# Patient Record
Sex: MALE | Race: OTHER | Employment: OTHER | URBAN - METROPOLITAN AREA
[De-identification: names, ages, dates, MRNs, and addresses within clinical notes are randomized per-mention and may not be internally consistent; named-entity substitution may affect disease eponyms.]

---

## 2019-06-01 ENCOUNTER — APPOINTMENT (OUTPATIENT)
Dept: GENERAL RADIOLOGY | Facility: HOSPITAL | Age: 54
DRG: 438 | End: 2019-06-01
Attending: EMERGENCY MEDICINE
Payer: COMMERCIAL

## 2019-06-01 ENCOUNTER — APPOINTMENT (OUTPATIENT)
Dept: CT IMAGING | Facility: HOSPITAL | Age: 54
DRG: 438 | End: 2019-06-01
Attending: EMERGENCY MEDICINE
Payer: COMMERCIAL

## 2019-06-01 ENCOUNTER — HOSPITAL ENCOUNTER (INPATIENT)
Facility: HOSPITAL | Age: 54
LOS: 6 days | Discharge: HOME HEALTH | DRG: 438 | End: 2019-06-07
Attending: EMERGENCY MEDICINE | Admitting: INTERNAL MEDICINE
Payer: COMMERCIAL

## 2019-06-01 DIAGNOSIS — R78.81 BACTEREMIA: Primary | ICD-10-CM

## 2019-06-01 PROBLEM — K85.91: Status: ACTIVE | Noted: 2019-06-01

## 2019-06-01 PROBLEM — D72.829 LEUKOCYTOSIS: Status: ACTIVE | Noted: 2019-06-01

## 2019-06-01 PROCEDURE — 99254 IP/OBS CNSLTJ NEW/EST MOD 60: CPT | Performed by: SURGERY

## 2019-06-01 PROCEDURE — 71045 X-RAY EXAM CHEST 1 VIEW: CPT | Performed by: EMERGENCY MEDICINE

## 2019-06-01 PROCEDURE — 74177 CT ABD & PELVIS W/CONTRAST: CPT | Performed by: EMERGENCY MEDICINE

## 2019-06-01 RX ORDER — ACETAMINOPHEN 500 MG
1000 TABLET ORAL ONCE
Status: COMPLETED | OUTPATIENT
Start: 2019-06-01 | End: 2019-06-01

## 2019-06-01 RX ORDER — HYDROCODONE BITARTRATE AND ACETAMINOPHEN 5; 325 MG/1; MG/1
1 TABLET ORAL EVERY 4 HOURS PRN
Status: ON HOLD | COMMUNITY
End: 2019-07-02

## 2019-06-01 RX ORDER — FAMOTIDINE 10 MG/ML
20 INJECTION, SOLUTION INTRAVENOUS ONCE
Status: COMPLETED | OUTPATIENT
Start: 2019-06-01 | End: 2019-06-01

## 2019-06-01 RX ORDER — HEPARIN SODIUM 5000 [USP'U]/ML
5000 INJECTION, SOLUTION INTRAVENOUS; SUBCUTANEOUS EVERY 12 HOURS SCHEDULED
Status: DISCONTINUED | OUTPATIENT
Start: 2019-06-01 | End: 2019-06-07

## 2019-06-01 RX ORDER — SODIUM CHLORIDE 9 MG/ML
INJECTION, SOLUTION INTRAVENOUS CONTINUOUS
Status: DISCONTINUED | OUTPATIENT
Start: 2019-06-01 | End: 2019-06-03

## 2019-06-01 RX ORDER — ONDANSETRON 2 MG/ML
4 INJECTION INTRAMUSCULAR; INTRAVENOUS EVERY 6 HOURS PRN
Status: DISCONTINUED | OUTPATIENT
Start: 2019-06-01 | End: 2019-06-07

## 2019-06-01 RX ORDER — MORPHINE SULFATE 2 MG/ML
2 INJECTION, SOLUTION INTRAMUSCULAR; INTRAVENOUS EVERY 4 HOURS PRN
Status: DISCONTINUED | OUTPATIENT
Start: 2019-06-01 | End: 2019-06-02

## 2019-06-01 NOTE — ED INITIAL ASSESSMENT (HPI)
Pt came in for fever over 102F since Monday. Recently treated with IV antibiotics for sepsis related to pancreatitis. RR even and nonlabored, mask applied, speaking in full sentences, ambulatory with steady gait.

## 2019-06-01 NOTE — ED PROVIDER NOTES
Patient Seen in: [facility] AND [facility] Emergency Department    History   Patient presents with:  Fever (infectious)    Stated Complaint:     HPI    Patient complains of fever chills body aches.   Patient reports that he was recently hospitalized with sepsis d awake tired appearing  HEAD: normocephalic, atraumatic,   EYES: PERRLA, EOMI, conj sclera clear  THROAT: mmm, no lesions  NECK: supple, no meningeal signs  LUNGS: no resp distress, cta bilateral  CARDIO: tachy regualr without murmur  GI: abdomen is soft an Normal   LIPASE - Normal   CBC WITH DIFFERENTIAL WITH PLATELET    Narrative: The following orders were created for panel order CBC WITH DIFFERENTIAL WITH PLATELET.   Procedure                               Abnormality         Status (CST): Zara Galicia MD on 6/01/2019 at 16:14            Procedures:      Critical Care:    CT pending      MDM   Consulted ID pancultured will get a CT abdomen to rule out a pancreatic pseudocyst possible abscess.   Sepsis bolus ordered along with broad-spe

## 2019-06-01 NOTE — ED NOTES
Orders for admission, patient is aware of plan and ready to go upstairs.  Any questions, please call ED RN Tammy Flores  at extension [phone]    Sepsis bolus total 2886 ml, currently infusing 886 ml to complete total bolus

## 2019-06-02 ENCOUNTER — APPOINTMENT (OUTPATIENT)
Dept: ULTRASOUND IMAGING | Facility: HOSPITAL | Age: 54
DRG: 438 | End: 2019-06-02
Attending: INTERNAL MEDICINE
Payer: COMMERCIAL

## 2019-06-02 PROCEDURE — 99232 SBSQ HOSP IP/OBS MODERATE 35: CPT | Performed by: SURGERY

## 2019-06-02 PROCEDURE — 76705 ECHO EXAM OF ABDOMEN: CPT | Performed by: INTERNAL MEDICINE

## 2019-06-02 RX ORDER — MORPHINE SULFATE 2 MG/ML
2 INJECTION, SOLUTION INTRAMUSCULAR; INTRAVENOUS
Status: DISCONTINUED | OUTPATIENT
Start: 2019-06-02 | End: 2019-06-07

## 2019-06-02 RX ORDER — ACETAMINOPHEN 325 MG/1
650 TABLET ORAL EVERY 6 HOURS PRN
Status: DISCONTINUED | OUTPATIENT
Start: 2019-06-02 | End: 2019-06-07

## 2019-06-02 RX ORDER — FLUCONAZOLE 2 MG/ML
400 INJECTION, SOLUTION INTRAVENOUS EVERY 24 HOURS
Status: DISCONTINUED | OUTPATIENT
Start: 2019-06-02 | End: 2019-06-07

## 2019-06-02 NOTE — CONSULTS
[address] [place] [address]  TEL: [phone]  FAX: [phone]    Luis Gotti Patient Status:  Inpatient    1965 MRN [id number]   Location [address] [place] [address] Attending Addison Ruano MD   Hosp Day # 1 PCP Kathrin Rubio Allergies    Medications:    Current Facility-Administered Medications:   •  morphINE sulfate (PF) 2 MG/ML injection 2 mg, 2 mg, Intravenous, Q3H PRN  •  acetaminophen (TYLENOL) tab 650 mg, 650 mg, Oral, Q6H PRN  •  Meropenem (MERREM) 500 mg in sodium chlo 06/02/2019    CREATSERUM 1.07 06/02/2019    BUN 13 06/02/2019     06/02/2019    K 3.8 06/02/2019     06/02/2019    CO2 23.0 06/02/2019     06/02/2019    CA 8.1 06/02/2019     06/02/2019       Microbiologic Data:     Reviewed in EM

## 2019-06-02 NOTE — CONSULTS
[facility] HOSP - [facility]    Report of Consultation    Bruno Rebollar Patient Status:  Inpatient    1965 MRN [id number]   Location [facility]5W Attending Mickey Collins MD   Hosp Day # 0 PCP Jatin Velasco     Date of Admission:  2019 Respiratory: Negative. Cardiovascular: Negative. Gastrointestinal: Positive for nausea and abdominal pain. Endocrine: Negative. Genitourinary: Negative. Musculoskeletal: Negative. Skin: Negative. Allergic/Immunologic: Negative.     Neuro Xr Chest Ap Portable  (cpt=71045)    Result Date: 6/1/2019  CONCLUSION:  1. Suboptimal inspiration. Mild bibasilar atelectasis. No large consolidation.     Dictated by (CST): Travis Tripathi MD on 6/01/2019 at 15:30     Approved by (CST): Travis Tripathi MD

## 2019-06-02 NOTE — PLAN OF CARE
Problem: Patient Centered Care  Goal: Patient preferences are identified and integrated in the patient's plan of care  Description  Interventions:  - What would you like us to know as we care for you?   - Provide timely, complete, and accurate informatio during anticipated neutropenic period  Description  INTERVENTIONS  - Monitor WBC  - Administer growth factors as ordered  - Implement neutropenic guidelines  Outcome: Progressing     Problem: SAFETY ADULT - FALL  Goal: Free from fall injury  Description  I

## 2019-06-02 NOTE — PROGRESS NOTES
[facility]    Progress Note    Josenial Overall Patient Status:  Inpatient    1965 MRN [id number]   Location [facility]5W Attending Beto Schilling MD   Hosp Day # 1 PCP [facility] - [facility]     Assessment and Plan:       Subacute p Urine -- 300 250    Void Urine Occurrence -- 1 x --    Total Output -- 300 250       Net I/O     -- 3800 -250          Exam:   /59 (BP Location: Left arm)   Pulse 96   Temp 100.2 °F (37.9 °C) (Oral)   Resp 20   Ht 6' (1.829 m)   Wt 212 lb (96.2 kg infiltration. 3.  Circumaortic left renal vein, normal anatomic variant. 4.  Colonic diverticulosis.      Dictated by (CST): Ana Lubin MD on 6/01/2019 at 16:07     Approved by (CST): Ana Lubin MD on 6/01/2019 at 16:14                    Ella Leyva MD

## 2019-06-02 NOTE — H&P
[address] Patient Status:  Inpatient    1965 MRN [id number]   Location [facility]5W Attending Rikki Gaines MD   Hosp Day # 0 MARITZA Castle     Date:  2019  Date of Admissi weakness  Endocrine: negative  Allergic/Immunologic: negative    Physical Exam:  /66 (BP Location: Left arm)   Pulse 88   Temp 98.6 °F (37 °C) (Oral)   Resp 18   Ht 6' (1.829 m)   Wt 212 lb (96.2 kg)   SpO2 99%   BMI 28.75 kg/m²     General Appearanc

## 2019-06-02 NOTE — PLAN OF CARE
Problem: Patient/Family Goals  Goal: Patient/Family Long Term Goal  Description  Patient's Long Term Goal: to return home    Interventions:  - monitor vitals  - monitor test results  - administer meds  - See additional Care Plan goals for specific interven based on assessment  - Modify environment to reduce risk of injury  - Provide assistive devices as appropriate  - Consider OT/PT consult to assist with strengthening/mobility  - Encourage toileting schedule  Outcome: Progressing     Problem: Caridad Velazquez

## 2019-06-02 NOTE — PROGRESS NOTES
[facility] Pharmacy Note:  Renal Adjustment for Meropenem (MERREM) 500 mg in sodium chloride 0.9% 100 mL    Luis Eduardo Mark is a 52year old male who has been prescribed Meropenem (MERREM) 500 mg in sodium chloride 0.9% 100 mL every 24 hrs.   CrCl is estimated creati

## 2019-06-02 NOTE — PROGRESS NOTES
[facility]    Progress Note    Michelle Alexanderallum Patient Status:  Inpatient    1965 MRN [id number]   Location [facility]5W Attending Faye Villalta MD   Hosp Day # 1 PCP JEFF ELLER     SUBJECTIVE:  Pt seen and examined at Intravenous Q8H       Assessment:  Assessment:  Patient Active Problem List:     Leukocytosis     Bacteremia     1. Pancreatic necrosis with possible abscess or cyst–recent history of pancreatitis, bacteremia  2. Leukocytosis  3.   History of bacteremia

## 2019-06-03 ENCOUNTER — APPOINTMENT (OUTPATIENT)
Dept: CT IMAGING | Facility: HOSPITAL | Age: 54
DRG: 438 | End: 2019-06-03
Attending: CLINICAL NURSE SPECIALIST
Payer: COMMERCIAL

## 2019-06-03 PROCEDURE — 99232 SBSQ HOSP IP/OBS MODERATE 35: CPT | Performed by: SURGERY

## 2019-06-03 PROCEDURE — 99152 MOD SED SAME PHYS/QHP 5/>YRS: CPT | Performed by: CLINICAL NURSE SPECIALIST

## 2019-06-03 PROCEDURE — 0F9G30Z DRAINAGE OF PANCREAS WITH DRAINAGE DEVICE, PERCUTANEOUS APPROACH: ICD-10-PCS | Performed by: RADIOLOGY

## 2019-06-03 PROCEDURE — 99153 MOD SED SAME PHYS/QHP EA: CPT | Performed by: CLINICAL NURSE SPECIALIST

## 2019-06-03 PROCEDURE — 49406 IMAGE CATH FLUID PERI/RETRO: CPT | Performed by: CLINICAL NURSE SPECIALIST

## 2019-06-03 RX ORDER — MORPHINE SULFATE 2 MG/ML
2 INJECTION, SOLUTION INTRAMUSCULAR; INTRAVENOUS EVERY 2 HOUR PRN
Status: DISCONTINUED | OUTPATIENT
Start: 2019-06-03 | End: 2019-06-07

## 2019-06-03 RX ORDER — SODIUM CHLORIDE 9 MG/ML
INJECTION, SOLUTION INTRAVENOUS CONTINUOUS
Status: DISCONTINUED | OUTPATIENT
Start: 2019-06-03 | End: 2019-06-03

## 2019-06-03 RX ORDER — NALOXONE HYDROCHLORIDE 0.4 MG/ML
80 INJECTION, SOLUTION INTRAMUSCULAR; INTRAVENOUS; SUBCUTANEOUS AS NEEDED
Status: DISCONTINUED | OUTPATIENT
Start: 2019-06-03 | End: 2019-06-07

## 2019-06-03 RX ORDER — MORPHINE SULFATE 4 MG/ML
4 INJECTION, SOLUTION INTRAMUSCULAR; INTRAVENOUS EVERY 2 HOUR PRN
Status: DISCONTINUED | OUTPATIENT
Start: 2019-06-03 | End: 2019-06-07

## 2019-06-03 RX ORDER — ACETAMINOPHEN 325 MG/1
650 TABLET ORAL EVERY 4 HOURS PRN
Status: DISCONTINUED | OUTPATIENT
Start: 2019-06-03 | End: 2019-06-07

## 2019-06-03 RX ORDER — 0.9 % SODIUM CHLORIDE 0.9 %
VIAL (ML) INJECTION
Status: COMPLETED
Start: 2019-06-03 | End: 2019-06-03

## 2019-06-03 RX ORDER — IPRATROPIUM BROMIDE AND ALBUTEROL SULFATE 2.5; .5 MG/3ML; MG/3ML
3 SOLUTION RESPIRATORY (INHALATION) EVERY 4 HOURS PRN
Status: DISCONTINUED | OUTPATIENT
Start: 2019-06-03 | End: 2019-06-07

## 2019-06-03 RX ORDER — MORPHINE SULFATE 4 MG/ML
INJECTION, SOLUTION INTRAMUSCULAR; INTRAVENOUS
Status: COMPLETED
Start: 2019-06-03 | End: 2019-06-03

## 2019-06-03 RX ORDER — FLUMAZENIL 0.1 MG/ML
0.2 INJECTION, SOLUTION INTRAVENOUS AS NEEDED
Status: DISCONTINUED | OUTPATIENT
Start: 2019-06-03 | End: 2019-06-07

## 2019-06-03 RX ORDER — MIDAZOLAM HYDROCHLORIDE 1 MG/ML
INJECTION INTRAMUSCULAR; INTRAVENOUS
Status: COMPLETED
Start: 2019-06-03 | End: 2019-06-03

## 2019-06-03 RX ORDER — HYDROCODONE BITARTRATE AND ACETAMINOPHEN 5; 325 MG/1; MG/1
1 TABLET ORAL EVERY 4 HOURS PRN
Status: DISCONTINUED | OUTPATIENT
Start: 2019-06-03 | End: 2019-06-07

## 2019-06-03 RX ORDER — IBUPROFEN 600 MG/1
600 TABLET ORAL EVERY 8 HOURS PRN
Status: DISCONTINUED | OUTPATIENT
Start: 2019-06-03 | End: 2019-06-07

## 2019-06-03 RX ORDER — MIDAZOLAM HYDROCHLORIDE 1 MG/ML
1 INJECTION INTRAMUSCULAR; INTRAVENOUS EVERY 5 MIN PRN
Status: DISCONTINUED | OUTPATIENT
Start: 2019-06-03 | End: 2019-06-07

## 2019-06-03 RX ORDER — HYDROCODONE BITARTRATE AND ACETAMINOPHEN 5; 325 MG/1; MG/1
2 TABLET ORAL EVERY 4 HOURS PRN
Status: DISCONTINUED | OUTPATIENT
Start: 2019-06-03 | End: 2019-06-07

## 2019-06-03 RX ORDER — MORPHINE SULFATE 2 MG/ML
1 INJECTION, SOLUTION INTRAMUSCULAR; INTRAVENOUS EVERY 2 HOUR PRN
Status: DISCONTINUED | OUTPATIENT
Start: 2019-06-03 | End: 2019-06-07

## 2019-06-03 NOTE — PROGRESS NOTES
[facility] ID PROGRESS NOTE    Mamadou Pelaezbeck Patient Status:  Inpatient    1965 MRN [id number]   Location [facility]5W Attending Jumana Montelongo MD   Hosp Day # 2 PCP DAYBREAK OF RINA ELLER     Subjective:  Awake, Tamx 102.5 overnight.  Remains NPO for I cell count was 25,000. CT scan of the abdomen pelvis shows peripancreatic inflammation along with fluid with some gas around the body and tail of the pancreas. So far, blood cultures negative. He continues to have fevers.   Continues to have the pain with

## 2019-06-03 NOTE — PROGRESS NOTES
In report RN was told patient is to have a drain placement with IR. IR does not have patient on the schedule, paged Dr. Carmen Schuler for orders or to contact IR deferred to Primary care. Dr. Adrianne Torres here rounding and was told face to face will call IR for consult.

## 2019-06-03 NOTE — PROGRESS NOTES
[facility]    Progress Note    Gil Michele Patient Status:  Inpatient    1965 MRN [id number]   Location [facility]5W Attending Rachel Moncada MD   Hosp Day # 2 PCP [facility] - [facility]     Assessment and Plan:       Subacute p mg in sodium chloride 0.9% 100 mL MBP) 100 -- --    Total Intake 4100 1100 400       Output    Urine  300  1200  --    Urine 300 1200 --    Void Urine Occurrence 1 x -- 1 x    Drains  --  --  80    Output (mL) (Closed/Suction Drain Lateral LLQ Bulb 12 Fr. )

## 2019-06-03 NOTE — BRIEF PROCEDURE NOTE
[facility] HOSP - [facility]  Procedure Note    Laura Delphine Patient Status:  Inpatient    1965 MRN [id number]   Location [facility]5W Attending Junior Mishra MD   Hosp Day # 2 PCP [facility] - [facility]     Procedure: CT-guided percutaneous

## 2019-06-03 NOTE — PAYOR COMM NOTE
--------------  ADMISSION REVIEW     Payor: [address] [place] [address] JORGE/ARSEN  Subscriber #:  [id number]  Authorization Number: [id number]    Admit date: 6/1/19  Admit time: 1843       Admitting Physician: Faye Villalta MD  Attending Physician:  Faye Villalta MD  [facility] (*)     Blood Urine Moderate (*)     Urobilinogen Urine 4.0 (*)     Ascorbic Acid Urine 40  (*)     RBC URINE 18 (*)     Bacteria Urine Few (*)     All other components within normal limits   BASIC METABOLIC PANEL (8) - Abnormal; Notable for the following possibility of infected pseudocyst or abscess. 2.  Hepatomegaly. Geographic pattern of fatty infiltration. 3.  Circumaortic left renal vein, normal anatomic variant. 4.  Colonic diverticulosis.           Disposition and Plan     Clinical Impression:  Marisol Mcfadden °C)Abnormal  — — — — — — — CL   06/02/19 1334 102.8 °F (39.3 °C)Abnormal  99 22 116/68 95 % — None (Room air) — CL       CA 8.1  WBC 16.1  HGB 10.4  HCT 31.3  RBC 3.49  NEUTROPHIL 13.40      ATTENDING -     Fever 102.5 last night  Still with abdomin unpredictable.        6/3/19 -     06/03/19 1215 99 °F (37.2 °C) 78 16 122/74 100 % — None (Room air) —    06/03/19 0903 99.2 °F (37.3 °C) 75 18 119/74 98 % — None (Room air) —    06/03/19 0447 100.4 °F (38 °C) 84 20 119/59 94 % — None (Room air) —

## 2019-06-03 NOTE — CM/SW NOTE
SW informed pt had recently been at [facility] with iv abx. However, the pt's abx had ended May 26, so he was not currently receiving these at home. SW will await indication by ID team of needs for abx and or drain care at [facility].     YOANNA Reagan,

## 2019-06-03 NOTE — PLAN OF CARE
Problem: Patient Centered Care  Goal: Patient preferences are identified and integrated in the patient's plan of care  Description  Interventions:  - What would you like us to know as we care for you?  I do not like taking morphine  - Provide timely, comp period  Description  INTERVENTIONS  - Monitor WBC  - Administer growth factors as ordered  - Implement neutropenic guidelines  Outcome: Progressing  Note:   Intermittent fevers noted     Problem: SAFETY ADULT - FALL  Goal: Free from fall injury  Descriptio medications at this time per Patient \"I think its the pressure of the fluid, I want to wait till after the procedure\"

## 2019-06-03 NOTE — DIETARY NOTE
Addendum 6/5/19: Met with patient to follow-up on PO intake and tolerance of Ensure Clear Apple TID. Pt advanced to low fiber/soft diet; reports eating well with improved appetite.  Pt reports eating >75% of last two meals: 6/4 dinner - meatloaf, mashed pot assistance: independent  - Nutrition education: assess education needs  - Coordination of nutrition care: collaboration with other providers  - Discharge and transfer of nutrition care to new setting or provider: monitor plans    ADMITTING DIAGNOSIS:   Varinder Bullock as above  ESTIMATED NUTRITION NEEDS:  Calories: 6073-9398 calories/day (20-22 calories per kg Actual body wt (ABW))  Protein: 115 grams protein/day (1.2 grams protein per kg Actual body wt (ABW))    MONITOR AND EVALUATE/NUTRITION GOALS:  - Food and Nutrien

## 2019-06-03 NOTE — PLAN OF CARE
Problem: Patient Centered Care  Goal: Patient preferences are identified and integrated in the patient's plan of care  Description  Interventions:  - What would you like us to know as we care for you?   - Provide timely, complete, and accurate informatio injury  Description  INTERVENTIONS:  - Assess pt frequently for physical needs  - Identify cognitive and physical deficits and behaviors that affect risk of falls.   - [place] fall precautions as indicated by assessment.  - Educate pt/family on patient sa

## 2019-06-03 NOTE — PROGRESS NOTES
[facility]    Progress Note    Raquel Billbrian Patient Status:  Inpatient    1965 MRN [id number]   Location [facility]5W Attending Mejia Modi MD   Hosp Day # 2 PCP JEFF ELLER     SUBJECTIVE:  Pt seen and examined at history of pancreatitis, bacteremia  2.  Leukocytosis–trending down  3.  History of bacteremia     Plan:     Continue n.p.o.  IV hydration  Pain control  Antiemetic  Surgery consult, spoke to Dr. Amanda Melissa, recommended IR consult. IR consult placed.   Spoke to CHARLES

## 2019-06-04 ENCOUNTER — APPOINTMENT (OUTPATIENT)
Dept: PICC SERVICES | Facility: HOSPITAL | Age: 54
DRG: 438 | End: 2019-06-04
Attending: PHYSICIAN ASSISTANT
Payer: COMMERCIAL

## 2019-06-04 PROCEDURE — 02HV33Z INSERTION OF INFUSION DEVICE INTO SUPERIOR VENA CAVA, PERCUTANEOUS APPROACH: ICD-10-PCS | Performed by: INTERNAL MEDICINE

## 2019-06-04 PROCEDURE — 99232 SBSQ HOSP IP/OBS MODERATE 35: CPT | Performed by: SURGERY

## 2019-06-04 RX ORDER — 0.9 % SODIUM CHLORIDE 0.9 %
3 VIAL (ML) INJECTION AS NEEDED
Status: DISCONTINUED | OUTPATIENT
Start: 2019-06-04 | End: 2019-06-07

## 2019-06-04 RX ORDER — SODIUM CHLORIDE 0.9 % (FLUSH) 0.9 %
10 SYRINGE (ML) INJECTION AS NEEDED
Status: DISCONTINUED | OUTPATIENT
Start: 2019-06-04 | End: 2019-06-07

## 2019-06-04 RX ORDER — CALCIUM CARBONATE 200(500)MG
1000 TABLET,CHEWABLE ORAL 2 TIMES DAILY PRN
Status: DISCONTINUED | OUTPATIENT
Start: 2019-06-04 | End: 2019-06-07

## 2019-06-04 RX ORDER — LIDOCAINE HYDROCHLORIDE 10 MG/ML
0.5 INJECTION, SOLUTION INFILTRATION; PERINEURAL ONCE AS NEEDED
Status: ACTIVE | OUTPATIENT
Start: 2019-06-04 | End: 2019-06-04

## 2019-06-04 RX ORDER — MAGNESIUM HYDROXIDE/ALUMINUM HYDROXICE/SIMETHICONE 120; 1200; 1200 MG/30ML; MG/30ML; MG/30ML
30 SUSPENSION ORAL 2 TIMES DAILY PRN
Status: DISCONTINUED | OUTPATIENT
Start: 2019-06-04 | End: 2019-06-07

## 2019-06-04 RX ORDER — 0.9 % SODIUM CHLORIDE 0.9 %
VIAL (ML) INJECTION
Status: COMPLETED
Start: 2019-06-04 | End: 2019-06-04

## 2019-06-04 NOTE — PLAN OF CARE
Focus: FEVER  Data: RECHECKED TEMP 1 HR AFTER TYLENOL GIVEN. 101.6  Action: APPLIED ICE PACK, DR. Bhavesh Copeland MADE AWARE, RECEIVED ORDER FOR AND GAVE IBUPROFEN.

## 2019-06-04 NOTE — PAYOR COMM NOTE
--------------  CONTINUED STAY REVIEW    Payor: [address] [place] [address] POS/ARSEN  Subscriber #:  [id number]  Authorization Number: [id number]    Admit date: 6/1/19  Admit time: 1843    Admitting Physician: Romeo Ceballos MD  Attending Physician:  Romeo Ceballos MD  [facility]

## 2019-06-04 NOTE — PROGRESS NOTES
[facility]    Progress Note    Micky Payan Patient Status:  Inpatient    1965 MRN [id number]   Location [facility]5W Attending Rose Greer MD   Hosp Day # 3 PCP [facility] RINA ELLER     Assessment and Plan:       Subacute p (Oral)   Resp 18   Ht 6' (1.829 m)   Wt 212 lb (96.2 kg)   SpO2 99%   BMI 28.75 kg/m²   Gen:  NAD  Abd:  Soft, mild left abdominal discomfort to palpation, ND, IR drain with thick purulent fluid.     Results:     Lab Results   Component Value Date    WBC 12

## 2019-06-04 NOTE — PROGRESS NOTES
[facility] Dosing Service    Initial Pharmacokinetic Consult for Vancomycin Dosing     Mamadou Reardon is a 52year old male who is being treated for intra-abdominal infection.   Pharmacy has been asked to dose Vancomycin by Dr. De Leon La Farge    He has No Known A

## 2019-06-04 NOTE — PROGRESS NOTES
[facility] ID PROGRESS NOTE    Walker Hastings Patient Status:  Inpatient    1965 MRN [id number]   Location [facility]5W Attending Kristy Cochran MD   Hosp Day # 3 PCP [facility] OF RINA ELLER     Subjective:  Awake, Tamx 101.6 overnight.  Having some pain yesterday with high fevers. White blood cell count was 25,000. CT scan of the abdomen pelvis shows peripancreatic inflammation along with fluid with some gas around the body and tail of the pancreas. So far, blood cultures negative.   He continues to have

## 2019-06-04 NOTE — CM/SW NOTE
SW notified that pt will likely need LT IV Abx at discharge. SW placed tentative referral to [facility] (pt lives in [place], [place] - close to [facility]). Sw to await for final recommendations.      Blaise Mendez, [address]

## 2019-06-04 NOTE — PROGRESS NOTES
[facility] [place] [facility]    [facility] Note     Patient Status:  Inpatient    1965 MRN [id number]   Location [facility]5W Attending Berto Brennan MD   Hosp Day # 3 PCP JEFF ELLER     SUBJECTIVE:  Pt seen and examined at acetaminophen (TYLENOL) tab 650 mg 650 mg Oral Q4H PRN   Or      HYDROcodone-acetaminophen (NORCO) 5-325 MG per tab 1 tablet 1 tablet Oral Q4H PRN   Or      HYDROcodone-acetaminophen (NORCO) 5-325 MG per tab 2 tablet 2 tablet Oral Q4H PRN   ipratropium-a

## 2019-06-05 PROCEDURE — 99232 SBSQ HOSP IP/OBS MODERATE 35: CPT | Performed by: SURGERY

## 2019-06-05 RX ORDER — POTASSIUM CHLORIDE 20 MEQ/1
40 TABLET, EXTENDED RELEASE ORAL EVERY 4 HOURS
Status: COMPLETED | OUTPATIENT
Start: 2019-06-05 | End: 2019-06-05

## 2019-06-05 NOTE — PLAN OF CARE
Problem: Patient Centered Care  Goal: Patient preferences are identified and integrated in the patient's plan of care  Description  Interventions:  - What would you like us to know as we care for you?  I do not like taking morphine  - Provide timely, comp Absence of fever/infection during anticipated neutropenic period  Description  INTERVENTIONS  - Monitor WBC  - Administer growth factors as ordered  - Implement neutropenic guidelines  Outcome: Progressing     Problem: SAFETY ADULT - FALL  Goal: Free from

## 2019-06-05 NOTE — PROGRESS NOTES
[facility] ID PROGRESS NOTE    Pilar Kirkland Patient Status:  Inpatient    1965 MRN [id number]   Location [facility]5W Attending Nhung Pettit MD   Hosp Day # 4 PCP [facility] OF RINA ELLER     Subjective:  Awake, Tamx 100 overnight. Feeling better.  Georgie Eid deep inspiration. He presented here yesterday with high fevers. White blood cell count was 25,000. CT scan of the abdomen pelvis shows peripancreatic inflammation along with fluid with some gas around the body and tail of the pancreas.  So far, blood cult

## 2019-06-05 NOTE — PAYOR COMM NOTE
--------------  CONTINUED STAY REVIEW    Payor: Uyen PATEL/ARSEN  Subscriber #:  [id number]  Authorization Number: [id number]    Admit date: 6/1/19  Admit time: 1843    Admitting Physician: Carolyn Amaro MD  Attending Physician:  Carolyn Amaro MD  [facility]

## 2019-06-05 NOTE — PROGRESS NOTES
[facility]    Progress Note     Patient Status:  Inpatient    1965 MRN [id number]   Location [facility]5W Attending Berto Brennan MD   Hosp Day # 4 PCP JEFF ELLER     SUBJECTIVE:  Pt seen and examined at chewable tab 1,000 mg 1,000 mg Oral BID PRN   Midazolam HCl (VERSED) 2 MG/2ML injection 1 mg 1 mg Intravenous Q5 Min PRN   fentaNYL citrate (SUBLIMAZE) 0.05 MG/ML injection 50 mcg 50 mcg Intravenous Q5 Min PRN   Naloxone HCl (NARCAN) 0.4 MG/ML injection 80 prophylaxis     POC discussed with nursing. Possible discharge soon with home IV antibiotic.     Plan of care discussed in detail with patient at bedside.  All questions answered.        [place] Guillermo MD   6/5/2019  4:30 PM

## 2019-06-05 NOTE — PROGRESS NOTES
[facility]    Progress Note    Dannial Overall Patient Status:  Inpatient    1965 MRN [id number]   Location [facility]5W Attending Beto Schilling MD   Hosp Day # 4 PCP DAYBREAK OF RINA ELLER     Assessment and Plan:       Subacute p Urine Occurrence 1 x -- --    Drains  230  145  25    Output (mL) (Closed/Suction Drain Lateral LLQ Bulb 12 Fr.) 230 145 25    Total Output 480 445 25       Net I/O     480 1175 -25          Exam:   /68 (BP Location: Right arm)   Pulse 78   Temp 98. 6

## 2019-06-06 ENCOUNTER — APPOINTMENT (OUTPATIENT)
Dept: CT IMAGING | Facility: HOSPITAL | Age: 54
DRG: 438 | End: 2019-06-06
Attending: PHYSICIAN ASSISTANT
Payer: COMMERCIAL

## 2019-06-06 PROCEDURE — 74177 CT ABD & PELVIS W/CONTRAST: CPT | Performed by: PHYSICIAN ASSISTANT

## 2019-06-06 PROCEDURE — 99232 SBSQ HOSP IP/OBS MODERATE 35: CPT | Performed by: SURGERY

## 2019-06-06 NOTE — PROGRESS NOTES
[facility]    Progress Note    Nate Zapata Patient Status:  Inpatient    1965 MRN [id number]   Location [facility]5W Attending Amy Archibald MD   Hosp Day # 5 PCP JEFF ELLER     SUBJECTIVE:  Pt seen and examined at Intravenous PRN   morphINE sulfate (PF) 2 MG/ML injection 1 mg 1 mg Intravenous Q2H PRN   Or      morphINE sulfate (PF) 2 MG/ML injection 2 mg 2 mg Intravenous Q2H PRN   Or      morphINE sulfate (PF) 4 MG/ML injection 4 mg 4 mg Intravenous Q2H PRN   acetam

## 2019-06-06 NOTE — PROGRESS NOTES
[facility]    Progress Note    Nandini Lara Patient Status:  Inpatient    1965 MRN [id number]   Location [facility]5W Attending Khalif Hogue MD   Hosp Day # 5 PCP [facility] - [facility]     Assessment and Plan:        Subacute (Ampicillin-Sulbactam Sodium (UNASYN) 3 g in sodium chloride 0.9% 100 mL MBP/ADD-vantage) -- 200 --    Total Intake 1620 220 480       Output    Urine  300  --  --    Urine 300 -- --    Drains  145  105  --    Output (mL) (Closed/Suction Drain Lateral LLQ

## 2019-06-06 NOTE — PROGRESS NOTES
[facility] ID PROGRESS NOTE    Angus Stanley Patient Status:  Inpatient    1965 MRN [id number]   Location [facility]5W Attending Jeannine Martini MD   Hosp Day # 5 PCP [facility]BREAK OF RINA ELLER     Subjective:  Awake, Tmax 101.2 overnight with chills.  80 cc Increased pain in the left side with some pickups. Increased pain with deep inspiration. He presented here yesterday with high fevers. White blood cell count was 25,000.   CT scan of the abdomen pelvis shows peripancreatic inflammation along with fluid wi

## 2019-06-06 NOTE — CM/SW NOTE
SW informed of likely need for iv abx at [place]. SW spoke with the pt who is comfortable, able and willing to do the home teaching as he had done that a few weeks ago.  Pt does not remember the name of the previous agency, and is agreeable to use services that

## 2019-06-06 NOTE — PAYOR COMM NOTE
--------------  CONTINUED STAY REVIEW    Payor: [address] [place] [address] JORGE/ARSEN  Subscriber #:  [id number]  Authorization Number: [id number]    Admit date: 6/1/19  Admit time: 1843    Admitting Physician: Tucker Fairbanks MD  Attending Physician:  Tucker Fairbanks MD  [facility] (NARCAN) 0.4 MG/ML injection 80 mcg 80 mcg Intravenous PRN   Flumazenil (ROMAZICON) injection 0.2 mg 0.2 mg Intravenous PRN   morphINE sulfate (PF) 2 MG/ML injection 1 mg 1 mg Intravenous Q2H PRN   Or         morphINE sulfate (PF) 2 MG/ML injection 2 mg 2 questions answered.     Alma Navarro MD   6/6/2019

## 2019-06-06 NOTE — PLAN OF CARE
Problem: Patient Centered Care  Goal: Patient preferences are identified and integrated in the patient's plan of care  Description  Interventions:  - What would you like us to know as we care for you?  I do not like taking morphine  - Provide timely, comp with strengthening/mobility  - Encourage toileting schedule  Outcome: Progressing     Problem: GASTROINTESTINAL - ADULT  Goal: Minimal or absence of nausea and vomiting  Description  INTERVENTIONS:  - Maintain adequate hydration with IV or PO as ordered an

## 2019-06-07 VITALS
DIASTOLIC BLOOD PRESSURE: 73 MMHG | SYSTOLIC BLOOD PRESSURE: 107 MMHG | HEART RATE: 79 BPM | OXYGEN SATURATION: 96 % | WEIGHT: 212 LBS | RESPIRATION RATE: 18 BRPM | BODY MASS INDEX: 28.71 KG/M2 | HEIGHT: 72 IN | TEMPERATURE: 99 F

## 2019-06-07 PROCEDURE — 99231 SBSQ HOSP IP/OBS SF/LOW 25: CPT | Performed by: SURGERY

## 2019-06-07 RX ORDER — VANCOMYCIN HYDROCHLORIDE 125 MG/1
125 CAPSULE ORAL DAILY
Qty: 28 CAPSULE | Refills: 0 | Status: ON HOLD | OUTPATIENT
Start: 2019-06-07 | End: 2019-06-18

## 2019-06-07 NOTE — PLAN OF CARE
Problem: Patient Centered Care  Goal: Patient preferences are identified and integrated in the patient's plan of care  Description  Interventions:  - What would you like us to know as we care for you?  I do not like taking morphine  - Provide timely, comp distraction and/or relaxation techniques  - Monitor for opioid side effects  - Notify MD/LIP if interventions unsuccessful or patient reports new pain  - Anticipate increased pain with activity and pre-medicate as appropriate  6/7/2019 1528 by Heaven David Progressing  6/7/2019 1356 by Cinthya Gracia RN  Outcome: Progressing   No fever this shift, will be discharged on invanz and daptomycin. Boaz drain intact.

## 2019-06-07 NOTE — PLAN OF CARE
Problem: Patient/Family Goals  Goal: Patient/Family Short Term Goal  Description  Patient's Short Term Goal: manage pain and nausea    Interventions:   - pain meds as needed  - IV fluids  - IV antibiotic  - See additional Care Plan goals for specific int ordered antiemetic medications  - Provide nonpharmacologic comfort measures as appropriate  - Advance diet as tolerated, if ordered  - Obtain nutritional consult as needed  - Evaluate fluid balance  Outcome: Progressing     Problem: RISK FOR INFECTION - AD

## 2019-06-07 NOTE — PROGRESS NOTES
[facility] ID PROGRESS NOTE    Teodoro Mensah Patient Status:  Inpatient    1965 MRN [id number]   Location [facility]5W Attending Jessica Taylor MD   Hosp Day # 6 PCP JEFF ELLER     Subjective:  Awake, Tmax 99.8. Afebrile. Tmax 99.8.  Still w Increased pain in the left side with some pickups. Increased pain with deep inspiration. He presented here yesterday with high fevers. White blood cell count was 25,000.   CT scan of the abdomen pelvis shows peripancreatic inflammation along with fluid w

## 2019-06-07 NOTE — PROGRESS NOTES
[facility] [facility]    Progress Note    Raquelcarroll Khanbrian Patient Status:  Inpatient    1965 MRN [id number]   Location [facility]5W Attending Mejia Modi MD   Hosp Day # 6 PCP DAYBREAK OF RINA ELLER     Assessment and Plan:     Stable clini 1.51 (H) 06/03/2019     06/02/2019    CK 23 (L) 06/05/2019       Ct Abdomen+pelvis(contrast Only)(cpt=74177)    Result Date: 6/6/2019  CONCLUSION:  1.  There has been placement of a left posterolateral approach percutaneous drainage catheter with con

## 2019-06-07 NOTE — CM/SW NOTE
4:12pm Update- Pt has been accepted by [facility]. Updated packet sent via [employer]. [facility] [phone]          4:01pm Update-Case declined by [facility], [facility].  Pending to [facility]  [phone], [place] at [place] [address]

## 2019-06-07 NOTE — PAYOR COMM NOTE
--------------  CONTINUED STAY REVIEW    Payor: Trula Fleischer POS/ARSEN  Subscriber #:  [id number]  Authorization Number: [id number]    Admit date: 6/1/19  Admit time: 1843    Admitting Physician: Tucker Fairbanks MD  Attending Physician:  Tucker Fairbanks MD  [facility] chills had improved. [facility] has been off the IV antibiotics for the past 4 to 5 days. [facility] states his last day of antibiotics was May 26.  He then started developing some progressive fatigue with fevers and chills.  Increased pain in the left side with some pi Dose Route User    6/7/2019 0609 New Bag 3 g Intravenous Judy Taylor RN    6/6/2019 2147 New Bag 3 g Intravenous Judy Taylor RN    6/6/2019 1444 New Bag 3 g Intravenous Timmy Sousa RN      Fluconazole in Sodium Chloride (DIFLUCAN) IVPB premix 4

## 2019-06-07 NOTE — DISCHARGE SUMMARY
[facility]    Discharge Summary    Madelyn Vargas Patient Status:  Inpatient    1965 MRN [id number]   Location [facility]5W Attending Cielo Moser MD   Hosp Day # 6 PCP Carlito Gomez     Date of Admission: 2019 Dis symptomatically for pain, nausea, vomiting. Seen and followed by surgery surgery service. Recommended for drain placement by IR. Patient was started on broad-spectrum antibiotic, infectious disease consulted.   Patient had drain placement by IR.fluid fro Follow up Visits:  Follow-up with PCP, surgery, ID in 2 weeks      Violeta Stanley  6/7/2019  1:52 PM

## 2019-06-14 ENCOUNTER — HOSPITAL ENCOUNTER (INPATIENT)
Facility: HOSPITAL | Age: 54
LOS: 4 days | Discharge: HOME HEALTH | DRG: 853 | End: 2019-06-18
Attending: EMERGENCY MEDICINE | Admitting: INTERNAL MEDICINE
Payer: COMMERCIAL

## 2019-06-14 ENCOUNTER — APPOINTMENT (OUTPATIENT)
Dept: GENERAL RADIOLOGY | Facility: HOSPITAL | Age: 54
DRG: 853 | End: 2019-06-14
Attending: EMERGENCY MEDICINE
Payer: COMMERCIAL

## 2019-06-14 ENCOUNTER — APPOINTMENT (OUTPATIENT)
Dept: CT IMAGING | Facility: HOSPITAL | Age: 54
DRG: 853 | End: 2019-06-14
Attending: EMERGENCY MEDICINE
Payer: COMMERCIAL

## 2019-06-14 DIAGNOSIS — A41.9 SEPSIS DUE TO UNDETERMINED ORGANISM (HCC): ICD-10-CM

## 2019-06-14 DIAGNOSIS — R50.9 ACUTE FEBRILE ILLNESS: Primary | ICD-10-CM

## 2019-06-14 DIAGNOSIS — J18.9 HCAP (HEALTHCARE-ASSOCIATED PNEUMONIA): ICD-10-CM

## 2019-06-14 DIAGNOSIS — K86.89 PERIPANCREATIC FLUID COLLECTION: ICD-10-CM

## 2019-06-14 PROCEDURE — 71045 X-RAY EXAM CHEST 1 VIEW: CPT | Performed by: EMERGENCY MEDICINE

## 2019-06-14 PROCEDURE — 74177 CT ABD & PELVIS W/CONTRAST: CPT | Performed by: EMERGENCY MEDICINE

## 2019-06-14 RX ORDER — IBUPROFEN 400 MG/1
400 TABLET ORAL ONCE
Status: COMPLETED | OUTPATIENT
Start: 2019-06-14 | End: 2019-06-14

## 2019-06-14 RX ORDER — HEPARIN SODIUM 5000 [USP'U]/ML
5000 INJECTION, SOLUTION INTRAVENOUS; SUBCUTANEOUS EVERY 8 HOURS SCHEDULED
Status: DISCONTINUED | OUTPATIENT
Start: 2019-06-14 | End: 2019-06-18

## 2019-06-14 RX ORDER — ACETAMINOPHEN 325 MG/1
650 TABLET ORAL EVERY 6 HOURS PRN
Status: DISCONTINUED | OUTPATIENT
Start: 2019-06-14 | End: 2019-06-18

## 2019-06-14 RX ORDER — SODIUM CHLORIDE 9 MG/ML
INJECTION, SOLUTION INTRAVENOUS CONTINUOUS
Status: DISCONTINUED | OUTPATIENT
Start: 2019-06-14 | End: 2019-06-17

## 2019-06-14 RX ORDER — MORPHINE SULFATE 2 MG/ML
2 INJECTION, SOLUTION INTRAMUSCULAR; INTRAVENOUS EVERY 4 HOURS PRN
Status: DISCONTINUED | OUTPATIENT
Start: 2019-06-14 | End: 2019-06-16

## 2019-06-14 RX ORDER — HYDROCODONE BITARTRATE AND ACETAMINOPHEN 5; 325 MG/1; MG/1
1 TABLET ORAL EVERY 4 HOURS PRN
Status: DISCONTINUED | OUTPATIENT
Start: 2019-06-14 | End: 2019-06-18

## 2019-06-14 RX ORDER — SODIUM CHLORIDE 9 MG/ML
INJECTION, SOLUTION INTRAVENOUS CONTINUOUS
Status: ACTIVE | OUTPATIENT
Start: 2019-06-14 | End: 2019-06-14

## 2019-06-14 NOTE — ED NOTES
Drainage noted around MYRA drain site. Dr. Carlos May aware. Instructed to reinforce with fenestrated gauze.

## 2019-06-14 NOTE — ED NOTES
Dao Hemphill is here for eval of persistent fevers. Recently admitted for bacteremia and pancreatic necrosis. Has MYRA drain placed in pancreas. He notes decreased drainage to drain. States fevers do not go away with antipyretics.   Last dose of tylenol at 2pm.

## 2019-06-14 NOTE — ED NOTES
Blood drawn from PICC line. 1 set of blood cultures obtained from PICC line. Another will be drawn peripherally by EDT.

## 2019-06-14 NOTE — ED INITIAL ASSESSMENT (HPI)
Fever for two days. Tylenol 30 minutes ago   Patient has picc line for antibiotics for pancreatitis.

## 2019-06-14 NOTE — ED PROVIDER NOTES
Patient Seen in: [facility] Emergency Department    History   Patient presents with:  Fever (infectious)    Stated Complaint: Fever 102.1, tylenol 30 minutes pta, recent discharge with pancreatitis, PICC l*     HPI    51 yo M with PMH pancreatitis w As per HPI  HENT: Negative for ear pain and rhinorrhea. (+) sore throat. Respiratory: (+) cough. Cardiovascular: Negative for chest pain and palpitations. Gastrointestinal: (+) abd pain.     Positive for stated complaint: Fever 102.1, tylenol 30 minute Albumin 2.7 (*)     Globulin  6.0 (*)     A/G Ratio 0.5 (*)     All other components within normal limits   BODY FLUID CULT,AEROBIC AND ANAEROBIC - Abnormal; Notable for the following components:    Body Fld Smear 2+ WBCs seen (*)     Body Fld Smear 3+ [facility]         Specimen Collected: 06/03/19 12:33 Last Resulted: 06/06/19 11:17             [facility]      Sepsis Reassessment Note    /84   Pulse 96   Temp (!) 101.1 °F (38.4 °C)   Resp 20   Wt 89.8 kg   SpO2 96%   BMI 26.85 k COMPARISON: [facility], CT DRAIN ABSCESS PERITONEAL (GUJ=94554), 6/03/2019, 11:08. [address] (UHK=01114), 6/02/2019, 9:32.   [facility], CT ABDOMEN PELVIS IV CONTRAST NO ORAL (ER), 6/01/2 particularly at the caudal retroperitoneal margin. SPLEEN: No enlargement. ADRENALS:   No defined mass or abnormal enlargement. KIDNEYS:   Symmetric enhancement is seen without evidence of hydronephrosis or underlying solid masses.  A partially exophytic edema is present. OTHER: Bilateral subpectoral implants are partially visualized. No free air or fluid is seen in the abdomen or pelvis. CONCLUSION:  1.  There has been placement of a left posterolateral approach percutaneous drainage catheter wit Approved by (CST): Dixon Rivers MD on 6/04/2019 at 17:41          Xr Chest Ap Portable  (cpt=71045)    Result Date: 6/14/2019  PROCEDURE: XR CHEST AP PORTABLE (CPT=71010) TIME: 1901  COMPARISON: [facility], XR CHEST AP PORTABLE (CPT=7104 Contrast, No Oral (er)    Result Date: 6/14/2019  PROCEDURE: CT ABDOMEN PELVIS IV CONTRAST NO ORAL (ER)  COMPARISON: [facility], CT ABDOMEN + PELVIS (CONTRAST ONLY) (CPT=74177), 6/06/2019, 12:28. INDICATIONS: Fever up to [address].   Epigastric in the left upper quadrant and central mesenteric distribution displaces surrounding loops of small bowel. No gas distended loops of small bowel are present to suggest obstruction. A noninflamed appendix is present in the right lower quadrant.   VASCULAR: dilatation. BILIARY: The gallbladder is present. No intra- or extrahepatic biliary ductal dilatation. SPLEEN: No enlargement or focal lesion. STOMACH: Small hiatal hernia. No gastric obstruction. Scattered duodenal diverticula.  PANCREAS: There is promi of gas associated with this finding raises possibility of infected pseudocyst or abscess. 2.  Hepatomegaly. Geographic pattern of fatty infiltration. 3.  Circumaortic left renal vein, normal anatomic variant. 4.  Colonic diverticulosis.      Dictated by (C This involved direct patient intervention, complex decision making, and/or extensive discussions with the patient, family, and clinical staff.     Disposition and Plan     Clinical Impression:  Acute febrile illness  (primary encounter diagnosis)  HCAP (he

## 2019-06-15 ENCOUNTER — APPOINTMENT (OUTPATIENT)
Dept: INTERVENTIONAL RADIOLOGY/VASCULAR | Facility: HOSPITAL | Age: 54
DRG: 853 | End: 2019-06-15
Attending: RADIOLOGY
Payer: COMMERCIAL

## 2019-06-15 PROCEDURE — 0FHG3YZ INSERTION OF OTHER DEVICE INTO PANCREAS, PERCUTANEOUS APPROACH: ICD-10-PCS | Performed by: RADIOLOGY

## 2019-06-15 PROCEDURE — 0FPG30Z REMOVAL OF DRAINAGE DEVICE FROM PANCREAS, PERCUTANEOUS APPROACH: ICD-10-PCS | Performed by: RADIOLOGY

## 2019-06-15 RX ORDER — LIDOCAINE HYDROCHLORIDE 20 MG/ML
INJECTION, SOLUTION EPIDURAL; INFILTRATION; INTRACAUDAL; PERINEURAL
Status: COMPLETED
Start: 2019-06-15 | End: 2019-06-15

## 2019-06-15 RX ORDER — MIDAZOLAM HYDROCHLORIDE 1 MG/ML
INJECTION INTRAMUSCULAR; INTRAVENOUS
Status: DISCONTINUED
Start: 2019-06-15 | End: 2019-06-15 | Stop reason: WASHOUT

## 2019-06-15 RX ORDER — IBUPROFEN 600 MG/1
600 TABLET ORAL EVERY 8 HOURS PRN
Status: DISCONTINUED | OUTPATIENT
Start: 2019-06-15 | End: 2019-06-18

## 2019-06-15 RX ORDER — SODIUM CHLORIDE 9 MG/ML
INJECTION, SOLUTION INTRAVENOUS
Status: COMPLETED
Start: 2019-06-15 | End: 2019-06-15

## 2019-06-15 RX ORDER — POTASSIUM CHLORIDE 14.9 MG/ML
20 INJECTION INTRAVENOUS ONCE
Status: COMPLETED | OUTPATIENT
Start: 2019-06-15 | End: 2019-06-15

## 2019-06-15 NOTE — H&P
[address] Patient Status:  Inpatient    1965 MRN [id number]   Location [facility] 5SW/SE Attending Armando Ford MD   Hosp Day # 0 MARITZA Rosado     Date:  2019  Date of A needed for Pain.  Disp:  Rfl:  6/12/2019       Review of Systems:  Constitutional: Fever and fatigue  Eyes: negative  Ears, nose, mouth, throat, and face: negative  Respiratory: negative for cough, dyspnea on exertion, hemoptysis, sputum and wheezing  Cardi Subacute pancreatic necrosis     Acute febrile illness     HCAP (healthcare-associated pneumonia)     Sepsis due to undetermined organism ([facility] [address]     Peripancreatic fluid collection      1. Acute febrile illness  2. Pancreatic abscess/necrosis  3.   HCAP  4

## 2019-06-15 NOTE — PROGRESS NOTES
[facility]    Progress Note    Mamadou Reardon Patient Status:  Inpatient    1965 MRN [id number]   Location [facility] 5SW/SE Attending Jumana Montelongo MD   Hosp Day # 1 PCP JEFF ELLER     SUBJECTIVE:  Pt seen and examine 0.9%  NaCl infusion  Intravenous Continuous       Assessment:     Leukocytosis     Bacteremia     Subacute pancreatic necrosis     Acute febrile illness     HCAP (healthcare-associated pneumonia)     Sepsis due to undetermined organism ([facility] [address]     Peripanc

## 2019-06-15 NOTE — PRE-SEDATION ASSESSMENT
[facility]  IR Pre-Procedure Sedation Assessment    History of snoring or sleep or apnea?    No    History of previous problems with anesthesia or sedation  No    Physical Findings:  Neck: nl ROM  CV: RRR  PULM: normal respiratory rate/effor

## 2019-06-15 NOTE — PROGRESS NOTES
Procedure performed in invasive cardiology/interventional Radiology. Drain exchanged and dressing changed. Specimen sent lab for culture. Hand off to Rn given.

## 2019-06-15 NOTE — PLAN OF CARE
Problem: Patient/Family Goals  Goal: Patient/Family Long Term Goal  Description  Patient's Long Term Goal: Treat infection    Interventions:  - Administer prescribed antibiotics  -assess and monitor for signs and symptoms of infection  -Infectious diseas Implement non-pharmacological measures as appropriate and evaluate response  - Consider cultural and social influences on pain and pain management  - Manage/alleviate anxiety  - Utilize distraction and/or relaxation techniques  - Monitor for opioid side ef

## 2019-06-15 NOTE — CONSULTS
[facility] ID CONSULT NOTE    Angus Stanley Patient Status:  Inpatient    1965 MRN [id number]   Location [facility] 5SW/SE Attending Jeannine Martini MD   Hosp Day # 1 PCP Lalo Johnson 70.       Reason for Consu history. reports that he has never smoked.  He has never used smokeless tobacco.    Allergies:  No Known Allergies    Medications:    Current Facility-Administered Medications:   •  ertapenem (INVANZ) 1 g in sodium chloride 0.9% 100 mL MBP, 1 g, Intraveno rhythm/pulse  Respiratory: Clear to auscultation bilaterally. No wheezes. No rhonchi. Abdomen: Soft, nontender, nondistended. BS active. L flank drain w/pos ttp, faint erythema, no drainage  Musculoskeletal: No edema noted  Integument: No lesions.  No kai dose of meropenem. Continued on daptomycin, ertapenem. ID consulted.      # Fever and leukocytosis, r/o bacteremia w/PICC vs intraabdom    - Peritoneal fluid cx- 3+ GNR/GPR   - Blood cx pending  # Severe pancreatitis with irineo-pancreatic inflammation with p

## 2019-06-15 NOTE — DIETARY NOTE
ADULT NUTRITION INITIAL ASSESSMENT    Pt is at moderate nutrition risk. Pt does not meet malnutrition criteria.       RECOMMENDATIONS TO MD:  See Nutrition Intervention     NUTRITION DIAGNOSIS/PROBLEM:  Inadequate oral intake related to alteration in GI fu pain    FOOD/NUTRITION RELATED HISTORY:  Appetite: Fair to good  Intake: NPO, advanced to clear today.     Intake Meeting Needs: No, but supplements to maximize  Food Allergies: No  Cultural/Ethnic/Gnosticism Preferences: Not Obtained    MEDICATIONS: reviewe

## 2019-06-15 NOTE — PROCEDURES
[place] [facility]  Brief IR Post-Procedure Note    Luis Shen Patient Status:  Inpatient    1965 MRN [id number]   Location [facility] 5SW/SE Attending Addison Ruano MD   Hosp Day # 1 PCP Kathrin Rubio     Procedure(s): Left

## 2019-06-16 PROCEDURE — 99254 IP/OBS CNSLTJ NEW/EST MOD 60: CPT | Performed by: SURGERY

## 2019-06-16 RX ORDER — POTASSIUM CHLORIDE 20 MEQ/1
40 TABLET, EXTENDED RELEASE ORAL ONCE
Status: COMPLETED | OUTPATIENT
Start: 2019-06-16 | End: 2019-06-16

## 2019-06-16 RX ORDER — 0.9 % SODIUM CHLORIDE 0.9 %
10 VIAL (ML) INJECTION AS NEEDED
Status: DISCONTINUED | OUTPATIENT
Start: 2019-06-16 | End: 2019-06-18

## 2019-06-16 RX ORDER — 0.9 % SODIUM CHLORIDE 0.9 %
20 VIAL (ML) INJECTION AS NEEDED
Status: DISCONTINUED | OUTPATIENT
Start: 2019-06-16 | End: 2019-06-18

## 2019-06-16 RX ORDER — MORPHINE SULFATE 2 MG/ML
2 INJECTION, SOLUTION INTRAMUSCULAR; INTRAVENOUS EVERY 4 HOURS PRN
Status: DISCONTINUED | OUTPATIENT
Start: 2019-06-16 | End: 2019-06-18

## 2019-06-16 NOTE — PLAN OF CARE
Problem: CARDIOVASCULAR - ADULT  Goal: Maintains optimal cardiac output and hemodynamic stability  Description  INTERVENTIONS:  - Monitor vital signs, rhythm, and trends  - Monitor for bleeding, hypotension and signs of decreased cardiac output  - Evalua Implement non-pharmacological measures as appropriate and evaluate response  - Consider cultural and social influences on pain and pain management  - Manage/alleviate anxiety  - Utilize distraction and/or relaxation techniques  - Monitor for opioid side ef

## 2019-06-16 NOTE — PLAN OF CARE
Problem: Patient/Family Goals  Goal: Patient/Family Long Term Goal  Description  Patient's Long Term Goal: Treat infection    Interventions:  - Administer prescribed antibiotics  -assess and monitor for signs and symptoms of infection  -Infectious diseas Nasogastric tube to low intermittent suction as ordered  - Evaluate effectiveness of ordered antiemetic medications  - Provide nonpharmacologic comfort measures as appropriate  - Advance diet as tolerated, if ordered  - Obtain nutritional consult as needed wound care per orders  - Initiate isolation precautions as appropriate  - Initiate Pressure Ulcer prevention bundle as indicated  Outcome: Progressing  MYRA draining purulent drainage, MYRA flushed w/ 10 ml NS at around [address], pt tolerated well.   Goal: S INFECTION - ADULT  Goal: Absence of fever/infection during anticipated neutropenic period  Description  INTERVENTIONS  - Monitor WBC  - Administer growth factors as ordered  - Implement neutropenic guidelines  Outcome: Not Progressing  Febrile, T 101.7, mo

## 2019-06-16 NOTE — PROGRESS NOTES
[facility] ID PROGRESS NOTE    Gayatri Bhatt Patient Status:  Inpatient    1965 MRN [id number]   Location [facility] 5SW/SE Attending Walker Fuentes MD   Hosp Day # 2 PCP DAYBREAK OF RINA ELLER     Subjective:   Febrile 101.7 early this AM. Awake and a fluconazole, transitioned to IV dapto and invanz x4 weeks with outpatient CT with PICC placed. Now presents to [facility] ER 6/14 with recurrent fevers. Abdominal pain improved since last admit, no emesis, diarrhea, dysuria.  Endorses sore throat and cough with in

## 2019-06-16 NOTE — CONSULTS
[facility]    Report of Consultation    Juan Acuña Patient Status:  Inpatient    1965 MRN [id number]   Location [facility] 5SW/SE Attending Melissa Escalante MD   Hosp Day # 2 PCP Naveen Ortiz     Date of Admission:   into the vein once for 1 dose. ertapenem 1 g 1 g in sodium chloride 0.9% 100 mL Inject 1 g into the vein one time for 1 dose. Vancomycin HCl 125 MG Oral Cap Take 1 capsule (125 mg total) by mouth daily for 28 days.    HYDROcodone-acetaminophen 5-325 MG HGB 10.5 (L) 06/15/2019    HCT 31.3 (L) 06/15/2019    .0 (H) 06/15/2019    CREATSERUM 1.04 06/15/2019    BUN 10 06/15/2019     06/15/2019    K 3.7 06/16/2019     06/15/2019    CO2 23.0 06/15/2019     (H) 06/15/2019    CA 8.0 (L) 0 the lower perioperative complications will be.     Recommendations:  Advance diet as tolerated. Ok to d/c home tomorrow if pt continues to do well. F/u in clinic in 2 weeks for surgical planning.         Thank you for allowing me to participate in the car

## 2019-06-17 PROCEDURE — 99232 SBSQ HOSP IP/OBS MODERATE 35: CPT | Performed by: SURGERY

## 2019-06-17 RX ORDER — POTASSIUM CHLORIDE 20 MEQ/1
40 TABLET, EXTENDED RELEASE ORAL ONCE
Status: COMPLETED | OUTPATIENT
Start: 2019-06-17 | End: 2019-06-17

## 2019-06-17 NOTE — PROGRESS NOTES
[facility] ID PROGRESS NOTE    Talon Guzman Patient Status:  Inpatient    1965 MRN [id number]   Location [facility] 5SW/SE Attending Tucker Fairbanks MD   Hosp Day # 3 PCP DAYBREAK OF RINA ELLER     Subjective:  Awake, Tmax 102.3 last night.  130 cc from melaninogenica. Repeat CT with decreased size of pseudocyst. Was on unasyn and fluconazole, transitioned to IV dapto and invanz x4 weeks with outpatient CT with PICC placed. Now presents to [facility] ER 6/14 with recurrent fevers.  Abdominal pain improved since l

## 2019-06-17 NOTE — PLAN OF CARE
Problem: Patient/Family Goals  Goal: Patient/Family Long Term Goal  Description  Patient's Long Term Goal: Treat infection    Interventions:  - Administer prescribed antibiotics  -assess and monitor for signs and symptoms of infection  -Infectious diseas suction as ordered  - Evaluate effectiveness of ordered antiemetic medications  - Provide nonpharmacologic comfort measures as appropriate  - Advance diet as tolerated, if ordered  - Obtain nutritional consult as needed  - Evaluate fluid balance  Outcome: appropriate  - Initiate Pressure Ulcer prevention bundle as indicated  Outcome: Progressing  Goal: Skin integrity remains intact  Description  INTERVENTIONS  - Assess and document risk factors for pressure ulcer development  - Assess and document skin inte

## 2019-06-17 NOTE — CM/SW NOTE
1242pm:  Carmen Dey from [facility] informed SW that pt is already current w/ their home services for Invanz 1g daily x4 weeks and Dapto 400 mg daily x4 weeks. Pt was arranged w/ [facility] for home teach & train.      Awaiting final orders to resume current IVAB o

## 2019-06-17 NOTE — PROGRESS NOTES
[facility]    Progress Note    Geena Morejon Patient Status:  Inpatient    1965 MRN [id number]   Location [facility] 5SW/SE Attending Armando Ford MD   Hosp Day # 3 PCP JEFF ELLER     SUBJECTIVE:  Pt seen and examine Peripancreatic fluid collection        1. Acute febrile illness  2. Pancreatic abscess/necrosis. Severe pancreatitis. Clogged percutaneous drain. 3.  HCAP  4. Sepsis  5. Leukocytosis  6.   History of bacteremia ESBL     Plan:  IR drain replacement wit

## 2019-06-17 NOTE — PAYOR COMM NOTE
--------------  ADMISSION REVIEW     Payor: Laura FRIEDMAN  Subscriber #:  [id number]  Authorization Number: [id number]    Admit date: 6/14/19  Admit time: 2104       Admitting Physician: Ebony Becker MD  Attending Physician:  Ebony Becker MD  [facility] following components:       Result Value    Protein Urine 100  (*)     Blood Urine Moderate (*)     RBC URINE 3 (*)     All other components within normal limits   COMP METABOLIC PANEL (14) - Abnormal; Notable for the following components:    Glucose 105 ( are identified in the abdomen or pelvis. Left lower lobe pneumonia with small effusion. Imaging follow-up to resolution recommended.         MDM     DIFFERENTIAL DIAGNOSIS: After history and physical exam differential diagnosis includes but is not limited 6/14/2019  Date of Admission:  6/14/2019      Assessment:  1. Acute febrile illness  2. Pancreatic abscess/necrosis  3. HCAP  4. Sepsis  5. Leukocytosis    Plan:  IR has been consulted by ER attending.   Plan for change of catheter by IR  Blood culture obstructive symptoms. IR drain in place with purulent fluid. Impression:     Subacute pancreatic necrosis    Leukocytosis    Bacteremia  -Readmitted for clogged percutaneous drain. S/p IR drain replacement with copious amount of purulent drainage.

## 2019-06-17 NOTE — PROGRESS NOTES
[place] [facility] - [facility]    Progress Note    Annamaria Burner Patient Status:  Inpatient    1965 MRN [id number]   Location [facility] 5SW/SE Attending Kody Leavitt MD   Hosp Day # 3 PCP [facility] - [facility]     Assessment and Plan:       Inocente Ontiveros Right;Posterior Back 14 Fr.) 48 146 165    Stool  --  --  --    Stool Occurrence -- 2 x --    Total Output 5971 [id number] 165       Net I/O     [phone]          Exam:   BP 96/62 (BP Location: Right arm)   Pulse 91   Temp 99.2 °F (37.3 °C) (Oral)   Resp 18

## 2019-06-17 NOTE — PLAN OF CARE
Problem: Patient/Family Goals  Goal: Patient/Family Long Term Goal  Description  Patient's Long Term Goal: Treat infection    Interventions:  - Administer prescribed antibiotics  -assess and monitor for signs and symptoms of infection  -Infectious diseas suction as ordered  - Evaluate effectiveness of ordered antiemetic medications  - Provide nonpharmacologic comfort measures as appropriate  - Advance diet as tolerated, if ordered  - Obtain nutritional consult as needed  - Evaluate fluid balance  Outcome: knowledge, values, beliefs, and cultural backgrounds into the planning and delivery of care  - Encourage patient/family to participate in care and decision-making at the level they choose  - Honor patient and family perspectives and choices   Outcome: Prog

## 2019-06-17 NOTE — PLAN OF CARE
Problem: Patient/Family Goals  Goal: Patient/Family Long Term Goal  Description  Patient's Long Term Goal: Treat infection    Interventions:  - Administer prescribed antibiotics  -assess and monitor for signs and symptoms of infection  -Infectious diseas suction as ordered  - Evaluate effectiveness of ordered antiemetic medications  - Provide nonpharmacologic comfort measures as appropriate  - Advance diet as tolerated, if ordered  - Obtain nutritional consult as needed  - Evaluate fluid balance  Outcome: appropriate  - Initiate Pressure Ulcer prevention bundle as indicated  Outcome: Progressing  Goal: Skin integrity remains intact  Description  INTERVENTIONS  - Assess and document risk factors for pressure ulcer development  - Assess and document skin inte draws blood. MYRA flushed, output recorded. IVAB administered. Plans to d/c to home on IV AB when stable.

## 2019-06-18 VITALS
WEIGHT: 206.31 LBS | HEART RATE: 84 BPM | BODY MASS INDEX: 27.94 KG/M2 | RESPIRATION RATE: 18 BRPM | TEMPERATURE: 98 F | HEIGHT: 72 IN | OXYGEN SATURATION: 97 % | DIASTOLIC BLOOD PRESSURE: 73 MMHG | SYSTOLIC BLOOD PRESSURE: 107 MMHG

## 2019-06-18 RX ORDER — POTASSIUM CHLORIDE 20 MEQ/1
40 TABLET, EXTENDED RELEASE ORAL ONCE
Status: COMPLETED | OUTPATIENT
Start: 2019-06-18 | End: 2019-06-18

## 2019-06-18 RX ORDER — VANCOMYCIN HYDROCHLORIDE 125 MG/1
125 CAPSULE ORAL DAILY
Qty: 28 CAPSULE | Refills: 0 | Status: ON HOLD | OUTPATIENT
Start: 2019-06-18 | End: 2019-07-02

## 2019-06-18 NOTE — PLAN OF CARE
Problem: Patient/Family Goals  Goal: Patient/Family Long Term Goal  Description  Patient's Long Term Goal: Treat infection    Interventions:  - Administer prescribed antibiotics  -assess and monitor for signs and symptoms of infection  -Infectious diseas control medications as ordered  - Initiate emergency measures for life threatening arrhythmias  - Monitor electrolytes and administer replacement therapy as ordered  6/18/2019 1736 by Codi Everett RN  Outcome: Adequate for Discharge  6/18/2019 non-pharmacological measures as appropriate and evaluate response  - Consider cultural and social influences on pain and pain management  - Manage/alleviate anxiety  - Utilize distraction and/or relaxation techniques  - Monitor for opioid side effects  - N information to patient/family  - Incorporate patient and family knowledge, values, beliefs, and cultural backgrounds into the planning and delivery of care  - Encourage patient/family to participate in care and decision-making at the level they choose  - H

## 2019-06-18 NOTE — PAYOR COMM NOTE
--------------  CONTINUED STAY REVIEW    Payor: [address] [place] [address] JORGE/ARSEN  Subscriber #:  [id number]  Authorization Number: [id number]    Admit date: 6/14/19  Admit time: 2104    Admitting Physician: Walker Fuentes MD  Attending Physician:  MD Yamel Horne fevers and chills. CT at that that time peripancreatic inflammation with fluid/gas. C/f possible necrosis. S/p IR drainage on 6/3 growing E.faecalis (S amp), S.anginosus, Prevotella melaninogenica.  Repeat CT with decreased size of pseudocyst. Was on unasyn pain. Overall feels better. Await Dapto susceptibility. Likely d/c in next 24h. Plan as above. Discussed with patient, RN, primary.             Cosigned by: Neda Murphy MD at 6/17/2019 10:52 PM             MEDICATIONS ADMINISTERED IN LAST 1 DAY:  DA

## 2019-06-18 NOTE — PAYOR COMM NOTE
--------------  CONTINUED STAY REVIEW    Payor: Helena PATEL/ARSEN  Subscriber #:  [id number]  Authorization Number: [id number]    Admit date: 6/14/19  Admit time: 2104    Admitting Physician: Hari Godwin MD  Attending Physician:  MD Jenniffer Dorado inflammation with fluid/gas. C/f possible necrosis. S/p IR drainage on 6/3 growing E.faecalis (S amp), S.anginosus, Prevotella melaninogenica.  Repeat CT with decreased size of pseudocyst. Was on unasyn and fluconazole, transitioned to IV dapto and invanz x Heparin Sodium (Porcine) 5000 UNIT/ML injection 5,000 Units     Date Action Dose Route User    6/18/2019 1321 Given 5000 Units Subcutaneous (Left Lower Abdomen) Angie Angeles RN      Meropenem (MERREM) 500 mg in sodium chloride 0.9% 100 mL MBP

## 2019-06-18 NOTE — PROGRESS NOTES
[facility] ID PROGRESS NOTE    Luis Eduardo Mark Patient Status:  Inpatient    1965 MRN [id number]   Location [facility] 5SW/SE Attending Ebony Becker MD   Hosp Day # 4 PCP DAYBREAK OF RINA ELLER     Subjective:  Awake, afebrile. No new complaints.      O Was on unasyn and fluconazole, transitioned to IV dapto and invanz x4 weeks with outpatient CT with PICC placed. Now presents to [address] [place] [address] ER 6/14 with recurrent fevers. Abdominal pain improved since last admit, no emesis, diarrhea, dysuria.  Endorses sore throat

## 2019-06-18 NOTE — CM/SW NOTE
Per Melinda Campos at [facility] to send patient home today    Patient to continue to receive same IVAB as previous admission    [facility] notified [facility] of patients discharge  Also CTL notified [facility] or discharge this evening    CTL will follow    Kari Boyer

## 2019-06-18 NOTE — PLAN OF CARE
Patient presents with vss, on IV abx, picc patent to left upper arm. MYRA drain flushed and patent. Afebrile. Call light and belongs within reach.

## 2019-06-18 NOTE — PLAN OF CARE
Per Dr. Vidhya Chester, pt states that when he was at home, he was flushing drain at the bulb site since he was unable to see where it would disconnect because it was towards his back. MD requests for RN to call [facility], [facility]. and inform her.  Spoke with [facility], [facility]. who state

## 2019-06-18 NOTE — DISCHARGE SUMMARY
[facility]    Discharge Summary    Mandy Mandelh Patient Status:  Inpatient    1965 MRN [id number]   Location [facility] 5SW/SE Attending Abby Miller MD   [address] Day # 4 PCP Siena Kenney     Date of Admission: 20 readmitted for clogged percutaneous drain. IR was consulted, had IR drain replacement with copious amount of purulent drainage. Patient was continued on daptomycin and meropenem. Patient was seen by surgery service and ID service.   Percutaneous drain co STOP taking these medications    ertapenem 1 g 1 g in sodium chloride 0.9% 100 mL  Replaced by:  ertapenem 1 g 1 g in sodium chloride 0.9% 100 mL              Where to Get Your Medications      Please  your prescriptions at the location directed b

## 2019-06-19 ENCOUNTER — TELEPHONE (OUTPATIENT)
Dept: MEDSURG UNIT | Facility: HOSPITAL | Age: 54
End: 2019-06-19

## 2019-06-19 NOTE — PAYOR COMM NOTE
--------------  DISCHARGE REVIEW    Payor: Dulce PATEL/ARSEN  Subscriber #:  [id number]  Authorization Number: [id number]    Admit date: 6/14/19  Admit time:  2104  Discharge Date: 6/18/2019  5:41 PM     Admitting Physician: Jumana Montelongo MD  Primary Car

## 2019-06-24 ENCOUNTER — OFFICE VISIT (OUTPATIENT)
Dept: CARDIOLOGY CLINIC | Facility: HOSPITAL | Age: 54
End: 2019-06-24
Attending: NURSE PRACTITIONER
Payer: COMMERCIAL

## 2019-06-24 VITALS
DIASTOLIC BLOOD PRESSURE: 74 MMHG | HEART RATE: 91 BPM | SYSTOLIC BLOOD PRESSURE: 105 MMHG | WEIGHT: 199 LBS | BODY MASS INDEX: 27 KG/M2 | OXYGEN SATURATION: 99 %

## 2019-06-24 DIAGNOSIS — J18.9 HCAP (HEALTHCARE-ASSOCIATED PNEUMONIA): Primary | ICD-10-CM

## 2019-06-24 DIAGNOSIS — J18.9 PNA (PNEUMONIA): ICD-10-CM

## 2019-06-24 DIAGNOSIS — K86.89 PERIPANCREATIC FLUID COLLECTION: ICD-10-CM

## 2019-06-24 PROCEDURE — 85025 COMPLETE CBC W/AUTO DIFF WBC: CPT | Performed by: NURSE PRACTITIONER

## 2019-06-24 PROCEDURE — 94618 PULMONARY STRESS TESTING: CPT | Performed by: NURSE PRACTITIONER

## 2019-06-24 PROCEDURE — 99213 OFFICE O/P EST LOW 20 MIN: CPT | Performed by: NURSE PRACTITIONER

## 2019-06-24 PROCEDURE — 80048 BASIC METABOLIC PNL TOTAL CA: CPT | Performed by: NURSE PRACTITIONER

## 2019-06-24 PROCEDURE — 36415 COLL VENOUS BLD VENIPUNCTURE: CPT | Performed by: NURSE PRACTITIONER

## 2019-06-24 NOTE — PATIENT INSTRUCTIONS
Use your incentive spirometer 4 sets of 10 daily     Continue all your same medications     Call if having any dizziness, lightheadedness, heart racing, palpitations, chest pain, shortness of breath, coughing, swelling, weight gain, fever, chills or worsen

## 2019-06-24 NOTE — PROGRESS NOTES
[facility] Patient Status:  No patient class for patient encounter    1965 MRN [id number]   Location [address]obert Horn is a 52year old male who presents t 12:36 PM     06/24/2019 12:36 PM    CO2 24.0 06/24/2019 12:36 PM    GLU 85 06/24/2019 12:36 PM    CA 9.2 06/24/2019 12:36 PM    ALB 2.7 (L) 06/14/2019 05:03 PM    ALKPHO 126 (H) 06/14/2019 05:03 PM    BILT 0.9 06/14/2019 05:03 PM    TP 8.7 (H) 06/14/ Dyspnea    Vaccines:  Flu: Next Due 9/1/19  PNA: Due    Education:  Patient and family instructed at length regarding clinic procedures, hours, purpose of clinic visits, medication regimen s/s of pneumonia and when to call APN/clinic. Patient receptive.

## 2019-06-28 ENCOUNTER — HOSPITAL ENCOUNTER (INPATIENT)
Dept: CT IMAGING | Facility: HOSPITAL | Age: 54
LOS: 4 days | Discharge: HOME HEALTH | DRG: 439 | End: 2019-07-02
Attending: INTERNAL MEDICINE | Admitting: INTERNAL MEDICINE
Payer: COMMERCIAL

## 2019-06-28 DIAGNOSIS — I82.90 THROMBOSIS: Primary | ICD-10-CM

## 2019-06-28 DIAGNOSIS — K85.91 NECROTIZING PANCREATITIS: ICD-10-CM

## 2019-06-28 LAB
ALBUMIN SERPL-MCNC: 3 G/DL (ref 3.4–5)
ALBUMIN/GLOB SERPL: 0.6 {RATIO} (ref 1–2)
ALP LIVER SERPL-CCNC: 89 U/L (ref 45–117)
ALT SERPL-CCNC: 21 U/L (ref 16–61)
ANION GAP SERPL CALC-SCNC: 9 MMOL/L (ref 0–18)
AST SERPL-CCNC: 11 U/L (ref 15–37)
BASOPHILS # BLD AUTO: 0.06 X10(3) UL (ref 0–0.2)
BASOPHILS NFR BLD AUTO: 0.3 %
BILIRUB SERPL-MCNC: 1.1 MG/DL (ref 0.1–2)
BUN BLD-MCNC: 10 MG/DL (ref 7–18)
BUN/CREAT SERPL: 8.1 (ref 10–20)
CALCIUM BLD-MCNC: 9.2 MG/DL (ref 8.5–10.1)
CHLORIDE SERPL-SCNC: 104 MMOL/L (ref 98–112)
CO2 SERPL-SCNC: 24 MMOL/L (ref 21–32)
CREAT BLD-MCNC: 1.23 MG/DL (ref 0.7–1.3)
DEPRECATED RDW RBC AUTO: 47.3 FL (ref 35.1–46.3)
EOSINOPHIL # BLD AUTO: 0.11 X10(3) UL (ref 0–0.7)
EOSINOPHIL NFR BLD AUTO: 0.6 %
ERYTHROCYTE [DISTWIDTH] IN BLOOD BY AUTOMATED COUNT: 14.6 % (ref 11–15)
GLOBULIN PLAS-MCNC: 5.2 G/DL (ref 2.8–4.4)
GLUCOSE BLD-MCNC: 109 MG/DL (ref 70–99)
HCT VFR BLD AUTO: 35.2 % (ref 39–53)
HGB BLD-MCNC: 11.4 G/DL (ref 13–17.5)
IMM GRANULOCYTES # BLD AUTO: 0.11 X10(3) UL (ref 0–1)
IMM GRANULOCYTES NFR BLD: 0.6 %
LYMPHOCYTES # BLD AUTO: 3.85 X10(3) UL (ref 1–4)
LYMPHOCYTES NFR BLD AUTO: 21.3 %
M PROTEIN MFR SERPL ELPH: 8.2 G/DL (ref 6.4–8.2)
MCH RBC QN AUTO: 28.6 PG (ref 26–34)
MCHC RBC AUTO-ENTMCNC: 32.4 G/DL (ref 31–37)
MCV RBC AUTO: 88.4 FL (ref 80–100)
MONOCYTES # BLD AUTO: 1.29 X10(3) UL (ref 0.1–1)
MONOCYTES NFR BLD AUTO: 7.1 %
NEUTROPHILS # BLD AUTO: 12.63 X10 (3) UL (ref 1.5–7.7)
NEUTROPHILS # BLD AUTO: 12.63 X10(3) UL (ref 1.5–7.7)
NEUTROPHILS NFR BLD AUTO: 70.1 %
OSMOLALITY SERPL CALC.SUM OF ELEC: 284 MOSM/KG (ref 275–295)
PLATELET # BLD AUTO: 432 10(3)UL (ref 150–450)
POTASSIUM SERPL-SCNC: 4 MMOL/L (ref 3.5–5.1)
RBC # BLD AUTO: 3.98 X10(6)UL (ref 4.3–5.7)
SODIUM SERPL-SCNC: 137 MMOL/L (ref 136–145)
WBC # BLD AUTO: 18.1 X10(3) UL (ref 4–11)

## 2019-06-28 RX ORDER — ACETAMINOPHEN 325 MG/1
650 TABLET ORAL EVERY 6 HOURS PRN
Status: DISCONTINUED | OUTPATIENT
Start: 2019-06-28 | End: 2019-07-02

## 2019-06-28 RX ORDER — HEPARIN SODIUM 5000 [USP'U]/ML
5000 INJECTION, SOLUTION INTRAVENOUS; SUBCUTANEOUS EVERY 8 HOURS SCHEDULED
Status: DISCONTINUED | OUTPATIENT
Start: 2019-06-28 | End: 2019-07-01

## 2019-06-28 RX ORDER — 0.9 % SODIUM CHLORIDE 0.9 %
10 VIAL (ML) INJECTION AS NEEDED
Status: DISCONTINUED | OUTPATIENT
Start: 2019-06-28 | End: 2019-07-02

## 2019-06-28 RX ORDER — 0.9 % SODIUM CHLORIDE 0.9 %
3 VIAL (ML) INJECTION EVERY 8 HOURS
Status: DISCONTINUED | OUTPATIENT
Start: 2019-06-28 | End: 2019-06-28

## 2019-06-28 RX ORDER — HYDROCODONE BITARTRATE AND ACETAMINOPHEN 5; 325 MG/1; MG/1
1 TABLET ORAL EVERY 4 HOURS PRN
Status: DISCONTINUED | OUTPATIENT
Start: 2019-06-28 | End: 2019-07-02

## 2019-06-28 RX ADMIN — HYDROCODONE BITARTRATE AND ACETAMINOPHEN 1 TABLET: 5; 325 TABLET ORAL at 23:05:00

## 2019-06-28 RX ADMIN — ACETAMINOPHEN 650 MG: 325 TABLET ORAL at 21:29:00

## 2019-06-28 RX ADMIN — HEPARIN SODIUM 5000 UNITS: 5000 INJECTION, SOLUTION INTRAVENOUS; SUBCUTANEOUS at 21:00:00

## 2019-06-29 ENCOUNTER — APPOINTMENT (OUTPATIENT)
Dept: GENERAL RADIOLOGY | Facility: HOSPITAL | Age: 54
DRG: 439 | End: 2019-06-29
Attending: INTERNAL MEDICINE
Payer: COMMERCIAL

## 2019-06-29 LAB
BACTERIA UR QL AUTO: NEGATIVE /HPF
BILIRUB UR QL: NEGATIVE
CK SERPL-CCNC: 19 U/L (ref 39–308)
CLARITY UR: CLEAR
COLOR UR: YELLOW
GLUCOSE UR-MCNC: NEGATIVE MG/DL
KETONES UR-MCNC: NEGATIVE MG/DL
LEUKOCYTE ESTERASE UR QL STRIP.AUTO: NEGATIVE
NITRITE UR QL STRIP.AUTO: NEGATIVE
PH UR: 5 [PH] (ref 5–8)
PROT UR-MCNC: NEGATIVE MG/DL
RBC #/AREA URNS AUTO: 7 /HPF
UROBILINOGEN UR STRIP-ACNC: <2
VIT C UR-MCNC: NEGATIVE MG/DL
WBC #/AREA URNS AUTO: 1 /HPF

## 2019-06-29 PROCEDURE — 71046 X-RAY EXAM CHEST 2 VIEWS: CPT | Performed by: INTERNAL MEDICINE

## 2019-06-29 RX ORDER — IBUPROFEN 600 MG/1
600 TABLET ORAL EVERY 6 HOURS PRN
Status: DISCONTINUED | OUTPATIENT
Start: 2019-06-29 | End: 2019-07-01

## 2019-06-29 RX ADMIN — IBUPROFEN 600 MG: 600 TABLET ORAL at 10:31:00

## 2019-06-29 RX ADMIN — HYDROCODONE BITARTRATE AND ACETAMINOPHEN 1 TABLET: 5; 325 TABLET ORAL at 18:19:00

## 2019-06-29 RX ADMIN — ACETAMINOPHEN 650 MG: 325 TABLET ORAL at 21:33:00

## 2019-06-29 RX ADMIN — ACETAMINOPHEN 650 MG: 325 TABLET ORAL at 04:35:00

## 2019-06-29 RX ADMIN — HEPARIN SODIUM 5000 UNITS: 5000 INJECTION, SOLUTION INTRAVENOUS; SUBCUTANEOUS at 15:03:00

## 2019-06-29 NOTE — PLAN OF CARE
Problem: Patient Centered Care  Goal: Patient preferences are identified and integrated in the patient's plan of care  Description  Interventions:  - What would you like us to know as we care for you? I have been admitted multiple times for same issue. neutropenic period  Description  INTERVENTIONS  - Monitor WBC  - Administer growth factors as ordered  - Implement neutropenic guidelines  Outcome: Progressing     Problem: SAFETY ADULT - FALL  Goal: Free from fall injury  Description  INTERVENTIONS:  - As replacement as ordered  - Monitor response to electrolyte replacements, including rhythm and repeat lab results as appropriate  - Fluid restriction as ordered  - Instruct patient on fluid and nutrition restrictions as appropriate  Outcome: Progressing

## 2019-06-29 NOTE — PROGRESS NOTES
[facility] Dosing Service  Antibiotic Dosing    Skinny Lopez is a 52year old male for whom pharmacy is dosing Daptomycin for treatment of necrotizing pancreatitis. Pacreatic drain clog and leaking from wound/ abscess .  Pharmacy to dose request by SHIRA Rae

## 2019-06-29 NOTE — PLAN OF CARE
Problem: PAIN - ADULT  Goal: Verbalizes/displays adequate comfort level or patient's stated pain goal  Description  INTERVENTIONS:  - Encourage pt to monitor pain and request assistance  - Assess pain using appropriate pain scale  - Administer analgesics infection  Description  INTERVENTIONS:  - Assess and document risk factors for pressure ulcer development  - Assess and document skin integrity  - Assess and document dressing/incision, wound bed, drain sites and surrounding tissue  - Implement wound care

## 2019-06-29 NOTE — CONSULTS
[facility] ID CONSULT NOTE    Mamadou Reardon Patient Status:  Inpatient    1965 MRN [id number]   Location [facility] 5SW/SE Attending Jumana Montelongo MD   Hosp Day # 1 PCP Arturo Mckinney MD       Reason for Consult 500 mg, Intravenous, Q8H  •  Heparin Sodium (Porcine) 5000 UNIT/ML injection 5,000 Units, 5,000 Units, Subcutaneous, Q8H [place]  •  HYDROcodone-acetaminophen (NORCO) 5-325 MG per tab 1 tablet, 1 tablet, Oral, Q4H PRN  •  acetaminophen (TYLENOL) tab 650 mg, 65 BUN 11 10   CREATSERUM 1.11 1.23   GFRAA 86 76   GFRNAA 74 66   CA 9.2 9.2   ALB  --  3.0*    137   K 3.8 4.0    104   CO2 24.0 24.0   ALKPHO  --  89   AST  --  11*   ALT  --  21   BILT  --  1.1   TP  --  8.2       Microbiology: Reviewed in E assess drain. Recommend surgery consultation for timing of surgery as was previously discussed with patient during previous admissions.  -  Follow fever curve, wbc. -  Reviewed labs, micro, imaging reports, available old records. -  Case d/w patient, RN.

## 2019-06-29 NOTE — CM/SW NOTE
6/29: SW received MDO for [facility] AT [facility] services. SW followed up with sister, Baylee Barragan, [facility]. SW to continue to follow and assist with setting up needs.      [place], [address] [place] [address]

## 2019-06-30 ENCOUNTER — APPOINTMENT (OUTPATIENT)
Dept: INTERVENTIONAL RADIOLOGY/VASCULAR | Facility: HOSPITAL | Age: 54
DRG: 439 | End: 2019-06-30
Attending: RADIOLOGY
Payer: COMMERCIAL

## 2019-06-30 LAB
BASOPHILS # BLD AUTO: 0.05 X10(3) UL (ref 0–0.2)
BASOPHILS NFR BLD AUTO: 0.3 %
DEPRECATED RDW RBC AUTO: 45.6 FL (ref 35.1–46.3)
EOSINOPHIL # BLD AUTO: 0.01 X10(3) UL (ref 0–0.7)
EOSINOPHIL NFR BLD AUTO: 0.1 %
ERYTHROCYTE [DISTWIDTH] IN BLOOD BY AUTOMATED COUNT: 14.5 % (ref 11–15)
HCT VFR BLD AUTO: 31.6 % (ref 39–53)
HGB BLD-MCNC: 10.5 G/DL (ref 13–17.5)
IMM GRANULOCYTES # BLD AUTO: 0.15 X10(3) UL (ref 0–1)
IMM GRANULOCYTES NFR BLD: 0.9 %
LYMPHOCYTES # BLD AUTO: 2.8 X10(3) UL (ref 1–4)
LYMPHOCYTES NFR BLD AUTO: 16.7 %
MCH RBC QN AUTO: 28.7 PG (ref 26–34)
MCHC RBC AUTO-ENTMCNC: 33.2 G/DL (ref 31–37)
MCV RBC AUTO: 86.3 FL (ref 80–100)
MONOCYTES # BLD AUTO: 1.38 X10(3) UL (ref 0.1–1)
MONOCYTES NFR BLD AUTO: 8.2 %
NEUTROPHILS # BLD AUTO: 12.37 X10 (3) UL (ref 1.5–7.7)
NEUTROPHILS # BLD AUTO: 12.37 X10(3) UL (ref 1.5–7.7)
NEUTROPHILS NFR BLD AUTO: 73.8 %
PLATELET # BLD AUTO: 300 10(3)UL (ref 150–450)
RBC # BLD AUTO: 3.66 X10(6)UL (ref 4.3–5.7)
WBC # BLD AUTO: 16.8 X10(3) UL (ref 4–11)

## 2019-06-30 PROCEDURE — 0W9H30Z DRAINAGE OF RETROPERITONEUM WITH DRAINAGE DEVICE, PERCUTANEOUS APPROACH: ICD-10-PCS | Performed by: RADIOLOGY

## 2019-06-30 PROCEDURE — 0WPHX0Z REMOVAL OF DRAINAGE DEVICE FROM RETROPERITONEUM, EXTERNAL APPROACH: ICD-10-PCS | Performed by: RADIOLOGY

## 2019-06-30 RX ORDER — MIDAZOLAM HYDROCHLORIDE 1 MG/ML
INJECTION INTRAMUSCULAR; INTRAVENOUS
Status: DISCONTINUED
Start: 2019-06-30 | End: 2019-06-30 | Stop reason: WASHOUT

## 2019-06-30 RX ORDER — ONDANSETRON 4 MG/1
4 TABLET, FILM COATED ORAL ONCE
Status: DISCONTINUED | OUTPATIENT
Start: 2019-06-30 | End: 2019-06-30

## 2019-06-30 RX ORDER — SODIUM CHLORIDE 9 MG/ML
INJECTION, SOLUTION INTRAVENOUS
Status: COMPLETED
Start: 2019-06-30 | End: 2019-06-30

## 2019-06-30 RX ORDER — ONDANSETRON 4 MG/1
4 TABLET, FILM COATED ORAL EVERY 6 HOURS PRN
Status: DISCONTINUED | OUTPATIENT
Start: 2019-06-30 | End: 2019-07-02

## 2019-06-30 RX ORDER — MIDAZOLAM HYDROCHLORIDE 1 MG/ML
INJECTION INTRAMUSCULAR; INTRAVENOUS
Status: COMPLETED
Start: 2019-06-30 | End: 2019-06-30

## 2019-06-30 RX ORDER — LIDOCAINE HYDROCHLORIDE 20 MG/ML
INJECTION, SOLUTION EPIDURAL; INFILTRATION; INTRACAUDAL; PERINEURAL
Status: COMPLETED
Start: 2019-06-30 | End: 2019-06-30

## 2019-06-30 RX ORDER — PANTOPRAZOLE SODIUM 40 MG/1
40 TABLET, DELAYED RELEASE ORAL
Status: DISCONTINUED | OUTPATIENT
Start: 2019-07-01 | End: 2019-07-02

## 2019-06-30 RX ADMIN — HEPARIN SODIUM 5000 UNITS: 5000 INJECTION, SOLUTION INTRAVENOUS; SUBCUTANEOUS at 22:15:00

## 2019-06-30 RX ADMIN — 0.9 % SODIUM CHLORIDE 30 ML: 0.9 % VIAL (ML) INJECTION at 04:26:00

## 2019-06-30 RX ADMIN — ACETAMINOPHEN 650 MG: 325 TABLET ORAL at 03:01:00

## 2019-06-30 RX ADMIN — IBUPROFEN 600 MG: 600 TABLET ORAL at 15:00:00

## 2019-06-30 NOTE — PLAN OF CARE
Problem: Patient Centered Care  Goal: Patient preferences are identified and integrated in the patient's plan of care  Description  Interventions:  - What would you like us to know as we care for you? I have been admitted multiple times for same issue. appropriate  6/30/2019 1632 by Shobha Saldana RN  Outcome: Progressing  6/30/2019 1146 by Shobha Saldana RN  Outcome: Progressing     Problem: RISK FOR INFECTION - ADULT  Goal: Absence of fever/infection during anticipated neutropenic period  Marna Fleischer social support system  6/30/2019 1632 by Mars Jimenez RN  Outcome: Progressing    Problem: METABOLIC/FLUID AND ELECTROLYTES - ADULT  Goal: Electrolytes maintained within normal limits  Description  INTERVENTIONS:  - Monitor labs and rhythm and assess p

## 2019-06-30 NOTE — H&P
[address] Patient Status:  Inpatient    1965 MRN [id number]   Location [facility] 5SW/SE Attending Jessica Taylor MD   Hosp Day # 1 PCP Kitty Rider MD     Date:  2019  Date of Adm and syncope  Gastrointestinal: negative for abdominal pain, change in bowel habits, constipation, diarrhea and vomiting.   Musculoskeletal: negative for back pain, neck pain and stiff joints  Neurological: negative for dizziness, headaches, tremors and weak pancreatic pseudocyst  5. History of bacteremia    Plan–  Continue daptomycin and meropenem per ID recommendation  Blood culture  ID consult  IR consult for drain check. Spoke to Dr. Chari Mohr n.p.o. post midnight for drain check tomorrow morning. Dallin Molina

## 2019-06-30 NOTE — PROGRESS NOTES
[facility]    Progress Note    Walker Hastings Patient Status:  Inpatient    1965 MRN [id number]   Location [facility] 5SW/SE Attending Kristy Cochran MD   Hosp Day # 2 PCP Jones Bence, MD     SUBJECTIVE:  Pt seen and examined (healthcare-associated pneumonia)     Sepsis due to undetermined organism ([facility] [address]     Peripancreatic fluid collection        1. Acute febrile illness  2. Leukocytosis  3.   Pancreatic necrosis status post pancreatic drain placement, decrease drainage from t

## 2019-06-30 NOTE — PROCEDURES
[facility] [place] [facility] - [facility]  Procedure Note    Geena Morejon Patient Status:  Inpatient    1965 MRN [id number]   Location [facility] Attending Armando Ford MD   Hosp Day # 2 PCP Sara Machado MD     Procedure: Rebeca Lao

## 2019-07-01 ENCOUNTER — APPOINTMENT (OUTPATIENT)
Dept: CT IMAGING | Facility: HOSPITAL | Age: 54
DRG: 439 | End: 2019-07-01
Attending: CLINICAL NURSE SPECIALIST
Payer: COMMERCIAL

## 2019-07-01 LAB
BASOPHILS # BLD AUTO: 0.04 X10(3) UL (ref 0–0.2)
BASOPHILS NFR BLD AUTO: 0.3 %
DEPRECATED RDW RBC AUTO: 46.2 FL (ref 35.1–46.3)
EOSINOPHIL # BLD AUTO: 0.02 X10(3) UL (ref 0–0.7)
EOSINOPHIL NFR BLD AUTO: 0.2 %
ERYTHROCYTE [DISTWIDTH] IN BLOOD BY AUTOMATED COUNT: 14.6 % (ref 11–15)
HCT VFR BLD AUTO: 31.5 % (ref 39–53)
HGB BLD-MCNC: 10.4 G/DL (ref 13–17.5)
IMM GRANULOCYTES # BLD AUTO: 0.06 X10(3) UL (ref 0–1)
IMM GRANULOCYTES NFR BLD: 0.5 %
LYMPHOCYTES # BLD AUTO: 2.45 X10(3) UL (ref 1–4)
LYMPHOCYTES NFR BLD AUTO: 21 %
MCH RBC QN AUTO: 28.5 PG (ref 26–34)
MCHC RBC AUTO-ENTMCNC: 33 G/DL (ref 31–37)
MCV RBC AUTO: 86.3 FL (ref 80–100)
MONOCYTES # BLD AUTO: 1.24 X10(3) UL (ref 0.1–1)
MONOCYTES NFR BLD AUTO: 10.6 %
NEUTROPHILS # BLD AUTO: 7.86 X10 (3) UL (ref 1.5–7.7)
NEUTROPHILS # BLD AUTO: 7.86 X10(3) UL (ref 1.5–7.7)
NEUTROPHILS NFR BLD AUTO: 67.4 %
PLATELET # BLD AUTO: 358 10(3)UL (ref 150–450)
RBC # BLD AUTO: 3.65 X10(6)UL (ref 4.3–5.7)
WBC # BLD AUTO: 11.7 X10(3) UL (ref 4–11)

## 2019-07-01 PROCEDURE — 99255 IP/OBS CONSLTJ NEW/EST HI 80: CPT | Performed by: INTERNAL MEDICINE

## 2019-07-01 PROCEDURE — 99254 IP/OBS CNSLTJ NEW/EST MOD 60: CPT | Performed by: SURGERY

## 2019-07-01 RX ORDER — ENOXAPARIN SODIUM 100 MG/ML
1 INJECTION SUBCUTANEOUS 2 TIMES DAILY
Status: DISCONTINUED | OUTPATIENT
Start: 2019-07-01 | End: 2019-07-02

## 2019-07-01 RX ADMIN — PANTOPRAZOLE SODIUM 40 MG: 40 TABLET, DELAYED RELEASE ORAL at 00:10:00

## 2019-07-01 RX ADMIN — 0.9 % SODIUM CHLORIDE 10 ML: 0.9 % VIAL (ML) INJECTION at 09:15:00

## 2019-07-01 RX ADMIN — HEPARIN SODIUM 5000 UNITS: 5000 INJECTION, SOLUTION INTRAVENOUS; SUBCUTANEOUS at 14:56:00

## 2019-07-01 RX ADMIN — IBUPROFEN 600 MG: 600 TABLET ORAL at 02:55:00

## 2019-07-01 RX ADMIN — IBUPROFEN 600 MG: 600 TABLET ORAL at 14:59:00

## 2019-07-01 RX ADMIN — ENOXAPARIN SODIUM 90 MG: 100 INJECTION SUBCUTANEOUS at 18:41:00

## 2019-07-01 RX ADMIN — 0.9 % SODIUM CHLORIDE 30 ML: 0.9 % VIAL (ML) INJECTION at 04:50:00

## 2019-07-01 RX ADMIN — PANTOPRAZOLE SODIUM 40 MG: 40 TABLET, DELAYED RELEASE ORAL at 05:06:00

## 2019-07-01 RX ADMIN — ACETAMINOPHEN 650 MG: 325 TABLET ORAL at 04:49:00

## 2019-07-01 RX ADMIN — HEPARIN SODIUM 5000 UNITS: 5000 INJECTION, SOLUTION INTRAVENOUS; SUBCUTANEOUS at 05:06:00

## 2019-07-01 RX ADMIN — ACETAMINOPHEN 650 MG: 325 TABLET ORAL at 23:35:00

## 2019-07-01 NOTE — PROGRESS NOTES
[facility]  Progress Note    Walker Hastings Patient Status:  Inpatient    1965 MRN [id number]   Location [facility] 5SW/SE Attending Kristy Cochran MD   Hosp Day # 3 PCP Jones Bence, MD       Subjective:   Denies pain or disco

## 2019-07-01 NOTE — PROGRESS NOTES
[facility] ID PROGRESS NOTE    Walker Hastings Patient Status:  Inpatient    1965 MRN [id number]   Location [facility] 5SW/SE Attending Kristy Cochran MD   Hosp Day # 3 PCP Jones Bence, MD     Subjective:  Awake, Tmax 102.6 yesterday afternoon.  No f with continued daptomycin and invanz upon DC with plans to FU with Dr. Gopal Escobedo and Dr. Velma Aiken next week. Now presented to [address] [place] [address] after being seen by PCP with decreased drainage from MYRA drain and increased abdominal pain.  No fevers or chills at home, although fe

## 2019-07-01 NOTE — PAYOR COMM NOTE
--------------  ADMISSION REVIEW     Payor: Miguel PATEL/ARSEN  Subscriber #:  [id number]  Authorization Number: [id number]    Admit date: 6/28/19  Admit time: 1813       Admitting Physician: Amy Archibald MD  Attending Physician:  Amy Archibald MD  [facility] at Unknown time   Vancomycin HCl 125 MG Oral Cap Take 1 capsule (125 mg total) by mouth daily for 28 days.  Disp: 28 capsule Rfl: 0 6/28/2019 at Unknown time   HYDROcodone-acetaminophen 5-325 MG Oral Tab Take 1 tablet by mouth every 4 (four) hours as needed 06/28/2019    ALKPHO 89 06/28/2019    BILT 1.1 06/28/2019    TP 8.2 06/28/2019    AST 11 06/28/2019    ALT 21 06/28/2019    CK 19 06/29/2019       Imaging:  Reviewed     Assessment:  Patient Active Problem List:     Leukocytosis     Bacteremia     Subacute in the last 168 hours.     Imaging:  Imaging reviewed in Epic.     Meds:      Current Facility-Administered Medications:  Ondansetron HCl (ZOFRAN) tab 4 mg 4 mg Oral Q6H PRN   ibuprofen (MOTRIN) tab 600 mg 600 mg Oral Q6H PRN   DAPTOmycin (CUBICIN) 500 mg

## 2019-07-01 NOTE — PAYOR COMM NOTE
--------------  CONTINUED STAY REVIEW    Payor: [place] POS/ARSEN  Subscriber #:  [id number]  Authorization Number: [id number]    Admit date: 6/28/19  Admit time: 1813    Admitting Physician: Juvenal Ham MD  Attending Physician:  Juvenal Ham MD  [facility] invanz for four week course with plans for eventual surgery with Dr. Christian Castillo, who returned to [address] [place] [address] on 6/14 with recurrent fevers, s/p IR drain exchange 6/15, cx with Amp-S E.  Faecalis, fevers resolved with continued daptomycin and invanz upon DC with plans to F (Right Lower Arm) Angel Astorga RN    6/30/2019 2215 Given 5000 Units Subcutaneous (Right Upper Abdomen) Angel Astorga RN      ibuprofen (MOTRIN) tab 600 mg     Date Action Dose Route User    7/1/2019 1459 Given 600 mg Oral Gayatri Chavez

## 2019-07-01 NOTE — PROGRESS NOTES
[facility]    Progress Note    Mamadou Reardon Patient Status:  Inpatient    1965 MRN [id number]   Location [facility] 5SW/SE Attending Jumana Montelongo MD   Hosp Day # 3 PCP Arturo Mckinney MD     SUBJECTIVE:  Pt seen and examined Assessment:    Patient Active Problem List:     Leukocytosis     Bacteremia     Subacute pancreatic necrosis     Acute febrile illness     HCAP (healthcare-associated pneumonia)     Sepsis due to undetermined organism ([facility] [address]     Peripancreatic fluid c

## 2019-07-02 ENCOUNTER — APPOINTMENT (OUTPATIENT)
Dept: CT IMAGING | Facility: HOSPITAL | Age: 54
DRG: 439 | End: 2019-07-02
Attending: CLINICAL NURSE SPECIALIST
Payer: COMMERCIAL

## 2019-07-02 VITALS
RESPIRATION RATE: 20 BRPM | OXYGEN SATURATION: 96 % | BODY MASS INDEX: 26.8 KG/M2 | HEART RATE: 107 BPM | WEIGHT: 197.88 LBS | SYSTOLIC BLOOD PRESSURE: 129 MMHG | HEIGHT: 72 IN | TEMPERATURE: 100 F | DIASTOLIC BLOOD PRESSURE: 76 MMHG

## 2019-07-02 LAB
F2 C.20210G>A GENO BLD/T: NORMAL
F5 P.R506Q BLD/T QL: NORMAL
HAV AB SER QL IA: REACTIVE
HAV IGM SER QL: NONREACTIVE
HBV CORE AB SERPL QL IA: NONREACTIVE
HBV SURFACE AB SER QL: NONREACTIVE
HBV SURFACE AB SERPL IA-ACNC: 6.65 MIU/ML
HBV SURFACE AG SERPL QL IA: NONREACTIVE
HCV AB SERPL QL IA: NONREACTIVE

## 2019-07-02 PROCEDURE — 74176 CT ABD & PELVIS W/O CONTRAST: CPT | Performed by: CLINICAL NURSE SPECIALIST

## 2019-07-02 RX ORDER — HYDROCODONE BITARTRATE AND ACETAMINOPHEN 5; 325 MG/1; MG/1
1 TABLET ORAL EVERY 4 HOURS PRN
Qty: 20 TABLET | Refills: 0 | Status: SHIPPED | OUTPATIENT
Start: 2019-07-02 | End: 2019-07-15 | Stop reason: ALTCHOICE

## 2019-07-02 RX ORDER — ENOXAPARIN SODIUM 100 MG/ML
1 INJECTION SUBCUTANEOUS 2 TIMES DAILY
Qty: 36 ML | Refills: 0 | Status: SHIPPED | OUTPATIENT
Start: 2019-07-02 | End: 2019-07-15 | Stop reason: ALTCHOICE

## 2019-07-02 RX ORDER — VANCOMYCIN HYDROCHLORIDE 125 MG/1
125 CAPSULE ORAL DAILY
Qty: 20 CAPSULE | Refills: 0 | Status: SHIPPED | OUTPATIENT
Start: 2019-07-02 | End: 2019-07-15 | Stop reason: ALTCHOICE

## 2019-07-02 RX ADMIN — ACETAMINOPHEN 650 MG: 325 TABLET ORAL at 08:48:00

## 2019-07-02 RX ADMIN — ENOXAPARIN SODIUM 90 MG: 100 INJECTION SUBCUTANEOUS at 05:42:00

## 2019-07-02 RX ADMIN — HYDROCODONE BITARTRATE AND ACETAMINOPHEN 1 TABLET: 5; 325 TABLET ORAL at 11:39:00

## 2019-07-02 RX ADMIN — HYDROCODONE BITARTRATE AND ACETAMINOPHEN 1 TABLET: 5; 325 TABLET ORAL at 01:57:00

## 2019-07-02 RX ADMIN — PANTOPRAZOLE SODIUM 40 MG: 40 TABLET, DELAYED RELEASE ORAL at 05:42:00

## 2019-07-02 NOTE — PROGRESS NOTES
Patient discharged home. Patient states he is familiar with lovenox shots, RN included instructions on discharge. Patient PICC flushed, understands to follow up with PCP, shlomo, raymon, and leah. Understands to call and make follow up appt with them.  This

## 2019-07-02 NOTE — PROGRESS NOTES
[facility]    Progress Note    Walker Hastings Patient Status:  Inpatient    1965 MRN [id number]   Location [facility] 5SW/SE Attending Kristy Cochran MD   Hosp Day # 4 PCP Jones Bence, MD     SUBJECTIVE:  Pt seen and examined post pancreatic drain placement, decrease drainage from the drain. 4.  Infected pancreatic pseudocyst  5.  History of bacteremia  6.   Chronic splenic vein thrombosis noted in the last CT scan–will get hematology consult     Plan–  Continue daptomycin and

## 2019-07-02 NOTE — CONSULTS
[facility]    Report of Consultation    Bruno Rebollar Patient Status:  Inpatient    1965 MRN [id number]   Location [facility] 5SW/SE Attending Mickey Collins MD   Hosp Day # 3 PCP Caroline Clancy MD     Consultation date:   Past Surgical History  Neg for recent surgery except MYRA drain  Family History  negatove for VTE    Social History  ETOH: about 3-4 ETOH daily, hard and beer, for the past 7-8 years, with acknowledgement of heavy drinking from family members. Symmetric expansion, non-labored breathing. No wheezing  Cardiovascular: S1, S2.  Regular rate. No murmurs. Abdomen: Soft, mild epigastric tenderness. Slightly enlargement of the spleen on exam. MYRA drain in place.  No rebound tenderness  Neurologic: No fo thrombophilia work up to make sure.   IN regards to treatment, would recommend short course of AC with lovenox BID to prevent further worsening of the thrombosis, since this is chronic in nature with collateral, it is unlikely that [facility] will prevent futher co

## 2019-07-02 NOTE — CONSULTS
[facility]    Report of Consultation    Laura Akers Patient Status:  Inpatient    1965 MRN [id number]   Location [facility] 5SW/SE Attending Junior Mishra MD   [facility] Day # 3 PCP Dariusz Cronin MD     Date of Admission:  / Admission:  DAPTOmycin 50 mg/mL in Sterile Water for Injection Inject 8 mL (400 mg total) into the vein daily for 28 days. ertapenem 1 g 1 g in sodium chloride 0.9% 100 mL Inject 1 g into the vein daily for 28 days.    Vancomycin HCl 125 MG Oral Cap Take Component Value Date    WBC 11.7 (H) 07/01/2019    HGB 10.4 (L) 07/01/2019    HCT 31.5 (L) 07/01/2019    .0 07/01/2019    CREATSERUM 1.23 06/28/2019    BUN 10 06/28/2019     06/28/2019    K 4.0 06/28/2019     06/28/2019    CO2 24.0 06/

## 2019-07-02 NOTE — PROGRESS NOTES
Spoke with Kar REEVES from infectious disease regarding discharge orders from primary, per Kar Mccartney she will contact her  who will then contact  waleska regarding antibiotics orders. Pending arrangement for discharge.

## 2019-07-02 NOTE — PROGRESS NOTES
[facility] ID PROGRESS NOTE    Talon Guzman Patient Status:  Inpatient    1965 MRN [id number]   Location [facility] 5SW/SE Attending Tucker Fairbanks MD   Hosp Day # 4 PCP Alma Navarro MD     Subjective:  Awake, Tmax 102. 3.  Tmax 100 this AM.      O plans for eventual surgery with Dr. Drew Chang, who returned to [address] [place] [address] on 6/14 with recurrent fevers, s/p IR drain exchange 6/15, cx with Amp-S E.  Faecalis, fevers resolved with continued daptomycin and invanz upon DC with plans to FU with Dr. Samson Goldmann and Dr. Drew Chang n

## 2019-07-02 NOTE — CM/SW NOTE
[facility] and aware that plan is to continue IV Abx at home. SW reached out to [facility] to start re-auth for pt. [facility] to reach out to SW once auth is obtained.     Rolando Mcconnell, [address]

## 2019-07-03 NOTE — PAYOR COMM NOTE
--------------  DISCHARGE REVIEW    Payor: [place] PATEL/ARSEN  Subscriber #:  [id number]  Authorization Number: [id number]    Admit date: 6/28/19  Admit time:  1813  Discharge Date: 7/2/2019  1:01 PM     Admitting Physician: Hari Godwin MD  Attending Ph

## 2019-07-09 ENCOUNTER — OFFICE VISIT (OUTPATIENT)
Dept: SURGERY | Facility: CLINIC | Age: 54
End: 2019-07-09
Payer: COMMERCIAL

## 2019-07-09 DIAGNOSIS — K85.92: Primary | ICD-10-CM

## 2019-07-09 PROCEDURE — 99214 OFFICE O/P EST MOD 30 MIN: CPT | Performed by: SURGERY

## 2019-07-09 NOTE — H&P (VIEW-ONLY)
HPI:    Patient ID: Geena Morejon is a 52year old male presenting with Patient presents with:  Hospital F/U: Pt here for hospital f/u pancreatitis. Pt cont. to do daily IV antibiotics. Pt has left flank IR drain.   Pt denies fever, bm problems and lack of Relationship status: Not on file      Intimate partner violence:        Fear of current or ex partner: Not on file        Emotionally abused: Not on file        Physically abused: Not on file        Forced sexual activity: Not on file    Other Topics He exhibits no distension and no mass. There is no tenderness. There is no rebound and no guarding. Left flank IR drain in place with necrotic, purulent drainage. Musculoskeletal: Normal range of motion.    Neurological: He is alert and oriented to pers

## 2019-07-09 NOTE — H&P
HPI:    Patient ID: Sonia Mcgregor is a 52year old male presenting with Patient presents with:  Hospital F/U: Pt here for hospital f/u pancreatitis. Pt cont. to do daily IV antibiotics. Pt has left flank IR drain.   Pt denies fever, bm problems and lack of Relationship status: Not on file      Intimate partner violence:        Fear of current or ex partner: Not on file        Emotionally abused: Not on file        Physically abused: Not on file        Forced sexual activity: Not on file    Other Topics He exhibits no distension and no mass. There is no tenderness. There is no rebound and no guarding. Left flank IR drain in place with necrotic, purulent drainage. Musculoskeletal: Normal range of motion.    Neurological: He is alert and oriented to pers

## 2019-07-12 ENCOUNTER — TELEPHONE (OUTPATIENT)
Dept: SURGERY | Facility: CLINIC | Age: 54
End: 2019-07-12

## 2019-07-12 NOTE — TELEPHONE ENCOUNTER
tyler from  pre adm testing at the hosp. Called. Need to know if pt is being admitted to the hosp prior to surgery date.   Call to advise

## 2019-07-15 ENCOUNTER — OFFICE VISIT (OUTPATIENT)
Dept: HEMATOLOGY/ONCOLOGY | Facility: HOSPITAL | Age: 54
End: 2019-07-15
Attending: INTERNAL MEDICINE
Payer: COMMERCIAL

## 2019-07-15 VITALS
DIASTOLIC BLOOD PRESSURE: 81 MMHG | SYSTOLIC BLOOD PRESSURE: 115 MMHG | OXYGEN SATURATION: 99 % | HEIGHT: 72 IN | BODY MASS INDEX: 26.55 KG/M2 | TEMPERATURE: 99 F | WEIGHT: 196 LBS | RESPIRATION RATE: 18 BRPM | HEART RATE: 93 BPM

## 2019-07-15 DIAGNOSIS — D72.825 BANDEMIA: ICD-10-CM

## 2019-07-15 DIAGNOSIS — D63.8 ANEMIA IN OTHER CHRONIC DISEASES CLASSIFIED ELSEWHERE: ICD-10-CM

## 2019-07-15 DIAGNOSIS — K85.22 ALCOHOL-INDUCED ACUTE PANCREATITIS WITH INFECTED NECROSIS: ICD-10-CM

## 2019-07-15 DIAGNOSIS — K85.91 NECROTIZING PANCREATITIS: ICD-10-CM

## 2019-07-15 DIAGNOSIS — K85.91: ICD-10-CM

## 2019-07-15 DIAGNOSIS — I82.90 THROMBOSIS: Primary | ICD-10-CM

## 2019-07-15 PROCEDURE — 99213 OFFICE O/P EST LOW 20 MIN: CPT | Performed by: INTERNAL MEDICINE

## 2019-07-15 RX ORDER — ENOXAPARIN SODIUM 100 MG/ML
100 INJECTION SUBCUTANEOUS DAILY
Qty: 7 SYRINGE | Refills: 0 | Status: ON HOLD | OUTPATIENT
Start: 2019-07-15 | End: 2019-08-01

## 2019-07-15 NOTE — PROGRESS NOTES
Cancer Center Progress Note    Patient Name: Walker Hastings   YOB: 1965   Medical Record Number: [id number]   Attending Physician: Firman Cockayne, M.D.      Chief Complaint:  Splenic vein thrombosis    Oncology History:  Patient is a 52year old ma Disp: 20 tablet, Rfl: 0  •  enoxaparin sodium 100 MG/ML Subcutaneous Solution, Inject 0.9 mL (90 mg total) into the skin 2 (two) times daily for 20 days. , Disp: 36 mL, Rfl: 0  •  Vancomycin HCl 125 MG Oral Cap, Take 1 capsule (125 mg total) by mouth daily 6/28/2019, 16:17. [facility], XR CHEST AP PORTABLE (CPT=71045), 6/14/2019, 18:57. INDICATIONS: Fever today. TECHNIQUE:   Two views. FINDINGS: CARDIAC/VASC: Normal.  No cardiac silhouette abnormality or cardiomegaly.   Unremarkable pulmo fat stranding is again seen, improved but not resolved since 6/28/2019. Pancreatic necrosis cannot be evaluated on a noncontrast examination. No gross pancreatic ductal dilation. ADRENALS: Unremarkable KIDNEYS: There is no hydronephrosis.   4.6 cm right r stranding and mild peripancreatic inflammatory fat stranding has also mildly decreased but not resolved since 6/28/2019.   Extension of the necrotic mid mesenteric collection into the lesser sac and left anterior periaortic space is also mildly decreased bu attenuation of the splenic vein along the body/tail junction of the pancreas with collaterals filling 2 cm segment of splenic vein at venous confluence. Portal vein is patent. ADRENALS: Normal. KIDNEYS: Left parapelvic renal cysts. .  A 4.1 cm simple right presenting with enlarging fluid collection and persistent fever.   : MD Sang  ANESTHESIA: Dr. Mag Garner provided 27 min of moderate sedation services for this procedure with the assistance of an independent trained observer who had no other du Fluoroscopy time:  0.5 min FINDINGS: Patient was placed in a decubitus position on the fluoroscopy table. The left flank was prepped and draped in typical sterile fashion. 1% lidocaine was used for subcutaneous  anesthesia.   The indwelling drain was inje

## 2019-07-17 RX ORDER — ALBUTEROL SULFATE 90 UG/1
2 AEROSOL, METERED RESPIRATORY (INHALATION) EVERY 4 HOURS PRN
COMMUNITY
End: 2020-03-11

## 2019-07-19 ENCOUNTER — LAB ENCOUNTER (OUTPATIENT)
Dept: LAB | Age: 54
End: 2019-07-19
Attending: SURGERY
Payer: COMMERCIAL

## 2019-07-19 DIAGNOSIS — Z01.818 PREOP TESTING: ICD-10-CM

## 2019-07-19 LAB
ANTIBODY SCREEN: NEGATIVE
RH BLOOD TYPE: POSITIVE

## 2019-07-19 PROCEDURE — 86900 BLOOD TYPING SEROLOGIC ABO: CPT

## 2019-07-19 PROCEDURE — 36415 COLL VENOUS BLD VENIPUNCTURE: CPT

## 2019-07-19 PROCEDURE — 86850 RBC ANTIBODY SCREEN: CPT

## 2019-07-19 PROCEDURE — 86901 BLOOD TYPING SEROLOGIC RH(D): CPT

## 2019-07-22 ENCOUNTER — ANESTHESIA EVENT (OUTPATIENT)
Dept: SURGERY | Facility: HOSPITAL | Age: 54
DRG: 405 | End: 2019-07-22
Payer: COMMERCIAL

## 2019-07-22 ENCOUNTER — ANESTHESIA (OUTPATIENT)
Dept: SURGERY | Facility: HOSPITAL | Age: 54
DRG: 405 | End: 2019-07-22
Payer: COMMERCIAL

## 2019-07-22 ENCOUNTER — HOSPITAL ENCOUNTER (INPATIENT)
Facility: HOSPITAL | Age: 54
LOS: 10 days | Discharge: HOME HEALTH | DRG: 405 | End: 2019-08-01
Attending: INTERNAL MEDICINE | Admitting: SURGERY
Payer: COMMERCIAL

## 2019-07-22 DIAGNOSIS — Z01.818 PREOP TESTING: Primary | ICD-10-CM

## 2019-07-22 DIAGNOSIS — K85.92: ICD-10-CM

## 2019-07-22 PROCEDURE — 48105 RESECT/DEBRIDE PANCREAS: CPT | Performed by: SURGERY

## 2019-07-22 PROCEDURE — 0FBG0ZZ EXCISION OF PANCREAS, OPEN APPROACH: ICD-10-PCS | Performed by: SURGERY

## 2019-07-22 RX ORDER — MORPHINE SULFATE 4 MG/ML
8 INJECTION, SOLUTION INTRAMUSCULAR; INTRAVENOUS EVERY 2 HOUR PRN
Status: DISCONTINUED | OUTPATIENT
Start: 2019-07-22 | End: 2019-08-01

## 2019-07-22 RX ORDER — ONDANSETRON 2 MG/ML
4 INJECTION INTRAMUSCULAR; INTRAVENOUS EVERY 6 HOURS PRN
Status: DISCONTINUED | OUTPATIENT
Start: 2019-07-22 | End: 2019-08-01

## 2019-07-22 RX ORDER — SODIUM CHLORIDE, SODIUM LACTATE, POTASSIUM CHLORIDE, CALCIUM CHLORIDE 600; 310; 30; 20 MG/100ML; MG/100ML; MG/100ML; MG/100ML
INJECTION, SOLUTION INTRAVENOUS CONTINUOUS
Status: DISCONTINUED | OUTPATIENT
Start: 2019-07-22 | End: 2019-07-22 | Stop reason: HOSPADM

## 2019-07-22 RX ORDER — MORPHINE SULFATE 2 MG/ML
2 INJECTION, SOLUTION INTRAMUSCULAR; INTRAVENOUS EVERY 10 MIN PRN
Status: DISCONTINUED | OUTPATIENT
Start: 2019-07-22 | End: 2019-07-22 | Stop reason: HOSPADM

## 2019-07-22 RX ORDER — FAMOTIDINE 20 MG/1
20 TABLET ORAL ONCE
Status: DISCONTINUED | OUTPATIENT
Start: 2019-07-22 | End: 2019-07-22 | Stop reason: HOSPADM

## 2019-07-22 RX ORDER — HYDROMORPHONE HYDROCHLORIDE 1 MG/ML
0.4 INJECTION, SOLUTION INTRAMUSCULAR; INTRAVENOUS; SUBCUTANEOUS EVERY 5 MIN PRN
Status: DISCONTINUED | OUTPATIENT
Start: 2019-07-22 | End: 2019-07-22 | Stop reason: HOSPADM

## 2019-07-22 RX ORDER — ONDANSETRON 2 MG/ML
4 INJECTION INTRAMUSCULAR; INTRAVENOUS ONCE AS NEEDED
Status: DISCONTINUED | OUTPATIENT
Start: 2019-07-22 | End: 2019-07-22 | Stop reason: HOSPADM

## 2019-07-22 RX ORDER — VECURONIUM BROMIDE 1 MG/ML
INJECTION, POWDER, LYOPHILIZED, FOR SOLUTION INTRAVENOUS AS NEEDED
Status: DISCONTINUED | OUTPATIENT
Start: 2019-07-22 | End: 2019-07-22 | Stop reason: SURG

## 2019-07-22 RX ORDER — METOCLOPRAMIDE 10 MG/1
10 TABLET ORAL ONCE
Status: DISCONTINUED | OUTPATIENT
Start: 2019-07-22 | End: 2019-07-22 | Stop reason: HOSPADM

## 2019-07-22 RX ORDER — DEXAMETHASONE SODIUM PHOSPHATE 4 MG/ML
VIAL (ML) INJECTION AS NEEDED
Status: DISCONTINUED | OUTPATIENT
Start: 2019-07-22 | End: 2019-07-22 | Stop reason: SURG

## 2019-07-22 RX ORDER — ACETAMINOPHEN 500 MG
1000 TABLET ORAL ONCE
Status: DISCONTINUED | OUTPATIENT
Start: 2019-07-22 | End: 2019-07-22 | Stop reason: HOSPADM

## 2019-07-22 RX ORDER — HYDROCODONE BITARTRATE AND ACETAMINOPHEN 5; 325 MG/1; MG/1
1 TABLET ORAL AS NEEDED
Status: DISCONTINUED | OUTPATIENT
Start: 2019-07-22 | End: 2019-07-22 | Stop reason: HOSPADM

## 2019-07-22 RX ORDER — HALOPERIDOL 5 MG/ML
0.25 INJECTION INTRAMUSCULAR ONCE AS NEEDED
Status: DISCONTINUED | OUTPATIENT
Start: 2019-07-22 | End: 2019-07-22 | Stop reason: HOSPADM

## 2019-07-22 RX ORDER — HEPARIN SODIUM 5000 [USP'U]/ML
5000 INJECTION, SOLUTION INTRAVENOUS; SUBCUTANEOUS EVERY 12 HOURS SCHEDULED
Status: DISCONTINUED | OUTPATIENT
Start: 2019-07-22 | End: 2019-08-01

## 2019-07-22 RX ORDER — MEROPENEM 500 MG/1
500 INJECTION, POWDER, FOR SOLUTION INTRAVENOUS DAILY
Status: ON HOLD | COMMUNITY
End: 2019-08-01

## 2019-07-22 RX ORDER — SODIUM CHLORIDE, SODIUM LACTATE, POTASSIUM CHLORIDE, CALCIUM CHLORIDE 600; 310; 30; 20 MG/100ML; MG/100ML; MG/100ML; MG/100ML
INJECTION, SOLUTION INTRAVENOUS CONTINUOUS
Status: DISCONTINUED | OUTPATIENT
Start: 2019-07-22 | End: 2019-07-23

## 2019-07-22 RX ORDER — HYDROMORPHONE HYDROCHLORIDE 1 MG/ML
0.2 INJECTION, SOLUTION INTRAMUSCULAR; INTRAVENOUS; SUBCUTANEOUS EVERY 5 MIN PRN
Status: DISCONTINUED | OUTPATIENT
Start: 2019-07-22 | End: 2019-07-22 | Stop reason: HOSPADM

## 2019-07-22 RX ORDER — MORPHINE SULFATE 10 MG/ML
6 INJECTION, SOLUTION INTRAMUSCULAR; INTRAVENOUS EVERY 10 MIN PRN
Status: DISCONTINUED | OUTPATIENT
Start: 2019-07-22 | End: 2019-07-22 | Stop reason: HOSPADM

## 2019-07-22 RX ORDER — MIDAZOLAM HYDROCHLORIDE 1 MG/ML
INJECTION INTRAMUSCULAR; INTRAVENOUS AS NEEDED
Status: DISCONTINUED | OUTPATIENT
Start: 2019-07-22 | End: 2019-07-22 | Stop reason: SURG

## 2019-07-22 RX ORDER — POLYETHYLENE GLYCOL 3350 17 G/17G
17 POWDER, FOR SOLUTION ORAL DAILY PRN
Status: DISCONTINUED | OUTPATIENT
Start: 2019-07-22 | End: 2019-08-01

## 2019-07-22 RX ORDER — BISACODYL 10 MG
10 SUPPOSITORY, RECTAL RECTAL
Status: DISCONTINUED | OUTPATIENT
Start: 2019-07-22 | End: 2019-08-01

## 2019-07-22 RX ORDER — HYDROCODONE BITARTRATE AND ACETAMINOPHEN 5; 325 MG/1; MG/1
1 TABLET ORAL EVERY 4 HOURS PRN
Status: DISCONTINUED | OUTPATIENT
Start: 2019-07-22 | End: 2019-08-01

## 2019-07-22 RX ORDER — MORPHINE SULFATE 4 MG/ML
4 INJECTION, SOLUTION INTRAMUSCULAR; INTRAVENOUS EVERY 2 HOUR PRN
Status: DISCONTINUED | OUTPATIENT
Start: 2019-07-22 | End: 2019-08-01

## 2019-07-22 RX ORDER — CEFAZOLIN SODIUM/WATER 2 G/20 ML
2 SYRINGE (ML) INTRAVENOUS ONCE
Status: COMPLETED | OUTPATIENT
Start: 2019-07-22 | End: 2019-07-22

## 2019-07-22 RX ORDER — NEOSTIGMINE METHYLSULFATE 0.5 MG/ML
INJECTION INTRAVENOUS AS NEEDED
Status: DISCONTINUED | OUTPATIENT
Start: 2019-07-22 | End: 2019-07-22 | Stop reason: SURG

## 2019-07-22 RX ORDER — NALOXONE HYDROCHLORIDE 0.4 MG/ML
80 INJECTION, SOLUTION INTRAMUSCULAR; INTRAVENOUS; SUBCUTANEOUS AS NEEDED
Status: DISCONTINUED | OUTPATIENT
Start: 2019-07-22 | End: 2019-07-22 | Stop reason: HOSPADM

## 2019-07-22 RX ORDER — PROCHLORPERAZINE EDISYLATE 5 MG/ML
5 INJECTION INTRAMUSCULAR; INTRAVENOUS ONCE AS NEEDED
Status: DISCONTINUED | OUTPATIENT
Start: 2019-07-22 | End: 2019-07-22 | Stop reason: HOSPADM

## 2019-07-22 RX ORDER — METOCLOPRAMIDE HYDROCHLORIDE 5 MG/ML
10 INJECTION INTRAMUSCULAR; INTRAVENOUS EVERY 6 HOURS PRN
Status: DISCONTINUED | OUTPATIENT
Start: 2019-07-22 | End: 2019-08-01

## 2019-07-22 RX ORDER — MORPHINE SULFATE 4 MG/ML
4 INJECTION, SOLUTION INTRAMUSCULAR; INTRAVENOUS EVERY 10 MIN PRN
Status: DISCONTINUED | OUTPATIENT
Start: 2019-07-22 | End: 2019-07-22 | Stop reason: HOSPADM

## 2019-07-22 RX ORDER — GLYCOPYRROLATE 0.2 MG/ML
INJECTION INTRAMUSCULAR; INTRAVENOUS AS NEEDED
Status: DISCONTINUED | OUTPATIENT
Start: 2019-07-22 | End: 2019-07-22 | Stop reason: SURG

## 2019-07-22 RX ORDER — SODIUM PHOSPHATE, DIBASIC AND SODIUM PHOSPHATE, MONOBASIC 7; 19 G/133ML; G/133ML
1 ENEMA RECTAL ONCE AS NEEDED
Status: DISCONTINUED | OUTPATIENT
Start: 2019-07-22 | End: 2019-08-01

## 2019-07-22 RX ORDER — HYDROCODONE BITARTRATE AND ACETAMINOPHEN 5; 325 MG/1; MG/1
2 TABLET ORAL EVERY 4 HOURS PRN
Status: DISCONTINUED | OUTPATIENT
Start: 2019-07-22 | End: 2019-08-01

## 2019-07-22 RX ORDER — ONDANSETRON 2 MG/ML
INJECTION INTRAMUSCULAR; INTRAVENOUS AS NEEDED
Status: DISCONTINUED | OUTPATIENT
Start: 2019-07-22 | End: 2019-07-22 | Stop reason: SURG

## 2019-07-22 RX ORDER — LIDOCAINE HYDROCHLORIDE 10 MG/ML
INJECTION, SOLUTION EPIDURAL; INFILTRATION; INTRACAUDAL; PERINEURAL AS NEEDED
Status: DISCONTINUED | OUTPATIENT
Start: 2019-07-22 | End: 2019-07-22 | Stop reason: SURG

## 2019-07-22 RX ORDER — SODIUM CHLORIDE 9 MG/ML
INJECTION, SOLUTION INTRAVENOUS CONTINUOUS PRN
Status: DISCONTINUED | OUTPATIENT
Start: 2019-07-22 | End: 2019-07-22 | Stop reason: SURG

## 2019-07-22 RX ORDER — HYDROMORPHONE HYDROCHLORIDE 1 MG/ML
0.6 INJECTION, SOLUTION INTRAMUSCULAR; INTRAVENOUS; SUBCUTANEOUS EVERY 5 MIN PRN
Status: DISCONTINUED | OUTPATIENT
Start: 2019-07-22 | End: 2019-07-22 | Stop reason: HOSPADM

## 2019-07-22 RX ORDER — DOCUSATE SODIUM 100 MG/1
100 CAPSULE, LIQUID FILLED ORAL 2 TIMES DAILY
Status: DISCONTINUED | OUTPATIENT
Start: 2019-07-22 | End: 2019-08-01

## 2019-07-22 RX ORDER — MORPHINE SULFATE 2 MG/ML
2 INJECTION, SOLUTION INTRAMUSCULAR; INTRAVENOUS EVERY 2 HOUR PRN
Status: DISCONTINUED | OUTPATIENT
Start: 2019-07-22 | End: 2019-08-01

## 2019-07-22 RX ORDER — HYDROCODONE BITARTRATE AND ACETAMINOPHEN 5; 325 MG/1; MG/1
2 TABLET ORAL AS NEEDED
Status: DISCONTINUED | OUTPATIENT
Start: 2019-07-22 | End: 2019-07-22 | Stop reason: HOSPADM

## 2019-07-22 RX ORDER — KETOROLAC TROMETHAMINE 30 MG/ML
30 INJECTION, SOLUTION INTRAMUSCULAR; INTRAVENOUS EVERY 6 HOURS
Status: COMPLETED | OUTPATIENT
Start: 2019-07-22 | End: 2019-07-24

## 2019-07-22 RX ORDER — DAPTOMYCIN 50 MG/ML
500 INJECTION, POWDER, LYOPHILIZED, FOR SOLUTION INTRAVENOUS DAILY
Status: ON HOLD | COMMUNITY
End: 2019-08-01

## 2019-07-22 RX ADMIN — NEOSTIGMINE METHYLSULFATE 5 MG: 0.5 INJECTION INTRAVENOUS at 14:40:00

## 2019-07-22 RX ADMIN — SODIUM CHLORIDE: 9 INJECTION, SOLUTION INTRAVENOUS at 14:39:00

## 2019-07-22 RX ADMIN — SODIUM CHLORIDE, SODIUM LACTATE, POTASSIUM CHLORIDE, CALCIUM CHLORIDE: 600; 310; 30; 20 INJECTION, SOLUTION INTRAVENOUS at 14:43:00

## 2019-07-22 RX ADMIN — LIDOCAINE HYDROCHLORIDE 30 MG: 10 INJECTION, SOLUTION EPIDURAL; INFILTRATION; INTRACAUDAL; PERINEURAL at 12:40:00

## 2019-07-22 RX ADMIN — VECURONIUM BROMIDE 2 MG: 1 INJECTION, POWDER, LYOPHILIZED, FOR SOLUTION INTRAVENOUS at 14:16:00

## 2019-07-22 RX ADMIN — LIDOCAINE HYDROCHLORIDE 20 MG: 10 INJECTION, SOLUTION EPIDURAL; INFILTRATION; INTRACAUDAL; PERINEURAL at 12:41:00

## 2019-07-22 RX ADMIN — VECURONIUM BROMIDE 3 MG: 1 INJECTION, POWDER, LYOPHILIZED, FOR SOLUTION INTRAVENOUS at 13:10:00

## 2019-07-22 RX ADMIN — VECURONIUM BROMIDE 7 MG: 1 INJECTION, POWDER, LYOPHILIZED, FOR SOLUTION INTRAVENOUS at 12:41:00

## 2019-07-22 RX ADMIN — GLYCOPYRROLATE 1 MG: 0.2 INJECTION INTRAMUSCULAR; INTRAVENOUS at 14:40:00

## 2019-07-22 RX ADMIN — SODIUM CHLORIDE: 9 INJECTION, SOLUTION INTRAVENOUS at 13:31:00

## 2019-07-22 RX ADMIN — DEXAMETHASONE SODIUM PHOSPHATE 4 MG: 4 MG/ML VIAL (ML) INJECTION at 13:25:00

## 2019-07-22 RX ADMIN — CEFAZOLIN SODIUM/WATER 2 G: 2 G/20 ML SYRINGE (ML) INTRAVENOUS at 13:00:00

## 2019-07-22 RX ADMIN — MIDAZOLAM HYDROCHLORIDE 2 MG: 1 INJECTION INTRAMUSCULAR; INTRAVENOUS at 12:37:00

## 2019-07-22 RX ADMIN — ONDANSETRON 4 MG: 2 INJECTION INTRAMUSCULAR; INTRAVENOUS at 13:25:00

## 2019-07-22 NOTE — ANESTHESIA POSTPROCEDURE EVALUATION
Patient: Laura Akers    Procedure Summary     Date:  07/22/19 Room / Location:  [facility] OR [address] [place] [address] OR    Anesthesia Start:  1237 Anesthesia Stop:      Procedure:  DIAGNOSTIC LAPAROSCOPY-GENERAL (N/A ) Diagnosis:       Infective necrosis of pancreas

## 2019-07-22 NOTE — ANESTHESIA PROCEDURE NOTES
Airway  Date/Time: 7/22/2019 12:43 PM  Urgency: elective    Airway not difficult    General Information and Staff    Patient location during procedure: OR  Anesthesiologist: Patrick Bonilla MD  Resident/CRNA: Michell Ford CRNA  Performed: anesthesiologist

## 2019-07-22 NOTE — OPERATIVE REPORT
Operative Report    Patient Name:  Skinny Lopez  MR:  [id number]  :  1965  DOS:  19    Preop Dx: Infective necrosis of pancreas [K85.92]  Postop Dx:   Infective necrosis of pancreas [K85.92]  Procedure:  Video assisted retroperitoneal debride necrotic/infected pancreatic tissue. The cavity was copiously irrigated with saline. Adequate hemostasis was seen. We removed the IR drain and exchanged it for a 24 F Franklin drain.   The drain was tunneled through a separate skin incision and held to the

## 2019-07-22 NOTE — ANESTHESIA PROCEDURE NOTES
Peripheral IV  Date/Time: 7/22/2019 1:00 PM  Inserted by: Eliana Hassan MD    Placement  Laterality: left  Location: forearm  Technique: anatomical landmarks  Attempts: 1

## 2019-07-22 NOTE — ANESTHESIA PROCEDURE NOTES
Peripheral IV  Date/Time: 7/22/2019 1:03 PM  Inserted by: Juli Frazier MD    Placement  Needle size: 16 G  Laterality: right  Location: forearm  Technique: anatomical landmarks  Attempts: 1

## 2019-07-22 NOTE — INTERVAL H&P NOTE
Pre-op Diagnosis: Infective necrosis of pancreas [K85.92]    The above referenced H&P was reviewed by Christine Isbell MD on 7/22/2019, the patient was examined and no significant changes have occurred in the patient's condition since the H&P was performed.   I di

## 2019-07-22 NOTE — ANESTHESIA PREPROCEDURE EVALUATION
Anesthesia PreOp Note    HPI:     Arturo Fox is a 52year old male who presents for preoperative consultation requested by: Pat Collins MD    Date of Surgery: 7/22/2019    Procedure(s):  DIAGNOSTIC LAPAROSCOPY-GENERAL  Indication: Infective necrosis of pa (LOVENOX) 100 MG/ML Subcutaneous Solution Inject 1 mL (100 mg total) into the skin daily for 7 days.  Disp: 7 Syringe Rfl: 0 7/20/2019 at Unknown time   Albuterol Sulfate  (90 Base) MCG/ACT Inhalation Aero Soln Inhale 2 puffs into the lungs every 4 ( together: Not on file        Attends Buddhist service: Not on file        Active member of club or organization: Not on file        Attends meetings of clubs or organizations: Not on file        Relationship status: Not on file      Intimate partner viole ROS   Abdominal                Anesthesia Plan:   ASA:  3  Plan:   General  Monitors and Lines:   A-line and Additonal IV  Informed Consent Plan and Risks Discussed With:  Patient  Discussed plan with:  CRNA      I have informed Thermon Sleight and/or legal g

## 2019-07-22 NOTE — ANESTHESIA PROCEDURE NOTES
Arterial Line  Date/Time: 7/22/2019 1:05 PM  Performed by: Mireille Torres MD  Authorized by: Mireille Torres MD     Procedure Start: 7/22/2019 1:04 PM  Procedure End:  7/22/2019 1:15 PM  Site Identification: real time ultrasound guided and image stored an

## 2019-07-23 LAB
ANION GAP SERPL CALC-SCNC: 7 MMOL/L (ref 0–18)
BASOPHILS # BLD AUTO: 0.04 X10(3) UL (ref 0–0.2)
BASOPHILS NFR BLD AUTO: 0.4 %
BUN BLD-MCNC: 12 MG/DL (ref 7–18)
BUN/CREAT SERPL: 12.6 (ref 10–20)
CALCIUM BLD-MCNC: 8.5 MG/DL (ref 8.5–10.1)
CHLORIDE SERPL-SCNC: 107 MMOL/L (ref 98–112)
CK SERPL-CCNC: 63 U/L (ref 39–308)
CO2 SERPL-SCNC: 25 MMOL/L (ref 21–32)
CREAT BLD-MCNC: 0.95 MG/DL (ref 0.7–1.3)
DEPRECATED RDW RBC AUTO: 48.4 FL (ref 35.1–46.3)
EOSINOPHIL # BLD AUTO: 0.03 X10(3) UL (ref 0–0.7)
EOSINOPHIL NFR BLD AUTO: 0.3 %
ERYTHROCYTE [DISTWIDTH] IN BLOOD BY AUTOMATED COUNT: 15.8 % (ref 11–15)
GLUCOSE BLD-MCNC: 106 MG/DL (ref 70–99)
HAV IGM SER QL: 2 MG/DL (ref 1.6–2.6)
HCT VFR BLD AUTO: 27.2 % (ref 39–53)
HGB BLD-MCNC: 8.6 G/DL (ref 13–17.5)
IMM GRANULOCYTES # BLD AUTO: 0.08 X10(3) UL (ref 0–1)
IMM GRANULOCYTES NFR BLD: 0.7 %
LYMPHOCYTES # BLD AUTO: 2.2 X10(3) UL (ref 1–4)
LYMPHOCYTES NFR BLD AUTO: 19.4 %
MCH RBC QN AUTO: 26.9 PG (ref 26–34)
MCHC RBC AUTO-ENTMCNC: 31.6 G/DL (ref 31–37)
MCV RBC AUTO: 85 FL (ref 80–100)
MONOCYTES # BLD AUTO: 0.93 X10(3) UL (ref 0.1–1)
MONOCYTES NFR BLD AUTO: 8.2 %
NEUTROPHILS # BLD AUTO: 8.07 X10 (3) UL (ref 1.5–7.7)
NEUTROPHILS # BLD AUTO: 8.07 X10(3) UL (ref 1.5–7.7)
NEUTROPHILS NFR BLD AUTO: 71 %
OSMOLALITY SERPL CALC.SUM OF ELEC: 288 MOSM/KG (ref 275–295)
PHOSPHATE SERPL-MCNC: 3.8 MG/DL (ref 2.5–4.9)
PLATELET # BLD AUTO: 439 10(3)UL (ref 150–450)
POTASSIUM SERPL-SCNC: 4.2 MMOL/L (ref 3.5–5.1)
RBC # BLD AUTO: 3.2 X10(6)UL (ref 4.3–5.7)
SODIUM SERPL-SCNC: 139 MMOL/L (ref 136–145)
WBC # BLD AUTO: 11.4 X10(3) UL (ref 4–11)

## 2019-07-23 NOTE — CM/SW NOTE
7/23: SW self-referred to patient chart for discharge planning needs post rounds with RN. Patient will transfer first thing this morning to floor; patient is independent at home. Per chart review, patient had recent hospitalization June/2019.  He discha

## 2019-07-23 NOTE — PROGRESS NOTES
[facility] Pharmacy Note:  Therapeutic Interchange    Ivette Arrieta was previously taking ertapenem Adelia Net) 1g IV q24h at home prior to admission.      Per therapeutic interchange and hospital policy, the patient will be switched to meropenem (Merrem) 500 mg IV q8

## 2019-07-23 NOTE — PROGRESS NOTES
Skin check completed with Rob Nunn RN. Skin intact other than Franklin drain from surgical site that is covered with an ABD and medipore tape. There is dry drainage that is serosang.

## 2019-07-23 NOTE — CONSULTS
[facility] ID CONSULT NOTE    Olga Thapa Patient Status:  Inpatient    1965 MRN [id number]   Location [facility] 4W/SW/SE Attending Agustina Anderson,  [facility]  Day # 1 PCP Jersey Middleton MD       Reason for Consultat sodium chloride 0.9% 100 mL MBP, 500 mg, Intravenous, Q8H  •  lactated ringers infusion, , Intravenous, Continuous  •  HYDROcodone-acetaminophen (NORCO) 5-325 MG per tab 1 tablet, 1 tablet, Oral, Q4H PRN **OR** HYDROcodone-acetaminophen (NORCO) 5-325 MG pe headache, dizziness, syncope, paralysis, ataxia, numbness or tingling in the extremities. MUSCULOSKELETAL: +Back/rib pain  HEMATOLOGIC:  No anemia, bleeding or bruising. ALLERGIES:  No history of asthma, hives, eczema or rhinitis.     Physical Exam:  Batsheva Dr. Sonia Jacques. PICC remains in place. ID consulted.     # Infected pancreatic pseudocyst s/p video assisted retroperitoneal debridement of pancreas 7/22, on IV daptomycin and meropenem PTA   -Previous cx with Amp-S E. faecalis, strep anginosus, Prevotella  # Post

## 2019-07-23 NOTE — PROGRESS NOTES
[facility]    Progress Note    Mamadou Reardon Patient Status:  Inpatient    1965 MRN [id number]   Location [facility] 2W/SW Attending Leona Esparza MD   Hosp Day # 1 PCP Arturo Mckinney MD     Assessment and Plan:     53 y/o man wit Intake    P.O.  --  620  --    P.O. -- 620 --    I.V.  --  3180  --    I.V. -- 1680 --    Volume (mL) (lactated ringers infusion) -- 500 --    Volume (mL) (0.9% NaCl infusion) -- 1000 --    Other  --  20  --    Other -- 20 --    Total Intake -- 3820 -- examined. I agree with Dr. Andressa Cordova note without addition.       Hoang Duran MD

## 2019-07-23 NOTE — H&P
[address] Patient Status:  Inpatient    1965 MRN [id number]   Location [facility] 4W/SW/SE Attending Svitlana Damon MD   [facility] Day # 1 PCP [place] Guillermo MD     Date:  2019  Date of Admis enoxaparin sodium (LOVENOX) 100 MG/ML Subcutaneous Solution Inject 1 mL (100 mg total) into the skin daily for 7 days.  Disp: 7 Syringe Rfl: 0 7/20/2019 at Unknown time       Review of Systems:  Constitutional: negative for fatigue and fevers  Eyes: negativ necrosis     Acute febrile illness     HCAP (healthcare-associated pneumonia)     Sepsis due to undetermined organism ([facility] [address]     Peripancreatic fluid collection     Thrombosis     Alcohol-induced acute pancreatitis with infected necrosis     Anemia in other

## 2019-07-24 ENCOUNTER — APPOINTMENT (OUTPATIENT)
Dept: CT IMAGING | Facility: HOSPITAL | Age: 54
DRG: 405 | End: 2019-07-24
Attending: SURGERY
Payer: COMMERCIAL

## 2019-07-24 ENCOUNTER — APPOINTMENT (OUTPATIENT)
Dept: CT IMAGING | Facility: HOSPITAL | Age: 54
DRG: 405 | End: 2019-07-24
Attending: CLINICAL NURSE SPECIALIST
Payer: COMMERCIAL

## 2019-07-24 LAB
AMYLASE PLR-CCNC: 32 U/L
BASOPHILS # BLD AUTO: 0.07 X10(3) UL (ref 0–0.2)
BASOPHILS NFR BLD AUTO: 0.4 %
BUN BLD-MCNC: 11 MG/DL (ref 7–18)
CREAT BLD-MCNC: 1.17 MG/DL (ref 0.7–1.3)
DEPRECATED RDW RBC AUTO: 48.3 FL (ref 35.1–46.3)
EOSINOPHIL # BLD AUTO: 0.3 X10(3) UL (ref 0–0.7)
EOSINOPHIL NFR BLD AUTO: 1.7 %
ERYTHROCYTE [DISTWIDTH] IN BLOOD BY AUTOMATED COUNT: 15.8 % (ref 11–15)
HCT VFR BLD AUTO: 27.6 % (ref 39–53)
HGB BLD-MCNC: 8.9 G/DL (ref 13–17.5)
IMM GRANULOCYTES # BLD AUTO: 0.2 X10(3) UL (ref 0–1)
IMM GRANULOCYTES NFR BLD: 1.1 %
LIPASE SERPL-CCNC: 59 U/L (ref 73–393)
LYMPHOCYTES # BLD AUTO: 2.75 X10(3) UL (ref 1–4)
LYMPHOCYTES NFR BLD AUTO: 15.3 %
MCH RBC QN AUTO: 27.4 PG (ref 26–34)
MCHC RBC AUTO-ENTMCNC: 32.2 G/DL (ref 31–37)
MCV RBC AUTO: 84.9 FL (ref 80–100)
MONOCYTES # BLD AUTO: 1.39 X10(3) UL (ref 0.1–1)
MONOCYTES NFR BLD AUTO: 7.7 %
NEUTROPHILS # BLD AUTO: 13.32 X10 (3) UL (ref 1.5–7.7)
NEUTROPHILS # BLD AUTO: 13.32 X10(3) UL (ref 1.5–7.7)
NEUTROPHILS NFR BLD AUTO: 73.8 %
PLATELET # BLD AUTO: 435 10(3)UL (ref 150–450)
RBC # BLD AUTO: 3.25 X10(6)UL (ref 4.3–5.7)
WBC # BLD AUTO: 18 X10(3) UL (ref 4–11)

## 2019-07-24 PROCEDURE — 0W9B30Z DRAINAGE OF LEFT PLEURAL CAVITY WITH DRAINAGE DEVICE, PERCUTANEOUS APPROACH: ICD-10-PCS | Performed by: RADIOLOGY

## 2019-07-24 PROCEDURE — 32557 INSERT CATH PLEURA W/ IMAGE: CPT | Performed by: CLINICAL NURSE SPECIALIST

## 2019-07-24 PROCEDURE — 49406 IMAGE CATH FLUID PERI/RETRO: CPT | Performed by: CLINICAL NURSE SPECIALIST

## 2019-07-24 PROCEDURE — 71260 CT THORAX DX C+: CPT | Performed by: SURGERY

## 2019-07-24 PROCEDURE — 74177 CT ABD & PELVIS W/CONTRAST: CPT | Performed by: SURGERY

## 2019-07-24 RX ORDER — MIDAZOLAM HYDROCHLORIDE 1 MG/ML
1 INJECTION INTRAMUSCULAR; INTRAVENOUS EVERY 5 MIN PRN
Status: DISCONTINUED | OUTPATIENT
Start: 2019-07-24 | End: 2019-08-01

## 2019-07-24 RX ORDER — SODIUM CHLORIDE 9 MG/ML
INJECTION, SOLUTION INTRAVENOUS CONTINUOUS
Status: DISCONTINUED | OUTPATIENT
Start: 2019-07-24 | End: 2019-08-01

## 2019-07-24 RX ORDER — ACETAMINOPHEN 325 MG/1
650 TABLET ORAL ONCE
Status: COMPLETED | OUTPATIENT
Start: 2019-07-24 | End: 2019-07-24

## 2019-07-24 RX ORDER — FLUMAZENIL 0.1 MG/ML
0.2 INJECTION, SOLUTION INTRAVENOUS AS NEEDED
Status: DISCONTINUED | OUTPATIENT
Start: 2019-07-24 | End: 2019-08-01

## 2019-07-24 RX ORDER — NALOXONE HYDROCHLORIDE 0.4 MG/ML
80 INJECTION, SOLUTION INTRAMUSCULAR; INTRAVENOUS; SUBCUTANEOUS AS NEEDED
Status: DISCONTINUED | OUTPATIENT
Start: 2019-07-24 | End: 2019-08-01

## 2019-07-24 RX ORDER — ALBUTEROL SULFATE 90 UG/1
2 AEROSOL, METERED RESPIRATORY (INHALATION) EVERY 4 HOURS PRN
Status: DISCONTINUED | OUTPATIENT
Start: 2019-07-24 | End: 2019-08-01

## 2019-07-24 RX ORDER — SODIUM CHLORIDE 0.9 % (FLUSH) 0.9 %
10 SYRINGE (ML) INJECTION AS NEEDED
Status: DISCONTINUED | OUTPATIENT
Start: 2019-07-24 | End: 2019-08-01

## 2019-07-24 RX ORDER — MIDAZOLAM HYDROCHLORIDE 1 MG/ML
INJECTION INTRAMUSCULAR; INTRAVENOUS
Status: DISPENSED
Start: 2019-07-24 | End: 2019-07-25

## 2019-07-24 NOTE — PROGRESS NOTES
[facility]    Progress Note    Sharri Sifuentes Patient Status:  Inpatient    1965 MRN [id number]   Location [facility] 2W/SW Attending Ty Cabrera MD   Hosp Day # 2 PCP Clydene Brittle, MD     Assessment and Plan:     53 y/o man wit dean drain in place to bulb suction with small amount of serosanguinous output.      Results:     Lab Results   Component Value Date    WBC 18.0 (H) 07/24/2019    HGB 8.9 (L) 07/24/2019    HCT 27.6 (L) 07/24/2019    .0 07/24/2019    CREATSERUM 1.17

## 2019-07-24 NOTE — PROGRESS NOTES
[facility] ID PROGRESS NOTE    Mandyreji GrullonOrlando Patient Status:  Inpatient    1965 MRN [id number]   Location [facility] 4W/SW/SE Attending Emerson Thomson,  [facility] Se Day # 2 PCP Guille Arzola MD     Subjective:  Tmax 101.2 yesterday.  Feels like walking help growing Amp-S E. faecalis, S.anginosus, Prevotella, sent on IV dapto and invanz for four week course with plans for eventual surgery with Dr. Velma Aiken, who returned to [address] [place] [address] on 6/14 with recurrent fevers, s/p IR drain exchange 6/15, cx with Amp-S E. faecalis, fev

## 2019-07-24 NOTE — BRIEF PROCEDURE NOTE
[facility]  Procedure Note    Dannial Overall Patient Status:  Inpatient    1965 MRN [id number]   Location [facility] 4W/SW/SE Attending Radha Loaiza, [facility] Day # 2 PCP Drew King MD     Procedure: CT-guided drainage of

## 2019-07-24 NOTE — PROGRESS NOTES
[facility] Dosing Service    Initial Pharmacokinetic Consult for Vancomycin Dosing     uLis Gotti is a 52year old male who is being treated for pneumonia. Pharmacy has been asked to dose Vancomycin by Dr. Vidhya Chester    He has No Known Allergies.       V

## 2019-07-24 NOTE — CM/SW NOTE
8/1 410pm: Plan is for discharge home today 8/1 with [facility] and [facility]. Updated information has been sent. [facility] will deliver the IV abx tomorrow morning and [facility] will see the pt. Tomorrow 8/2. [address] orders have been sent.    [facility] [phone]  A

## 2019-07-24 NOTE — PROGRESS NOTES
[place] [facility]    Progress Note    Romadamien Palenciaan Patient Status:  Inpatient    1965 MRN [id number]   Location [facility] 4W/SW/SE Attending Nitish Solis MD   Hosp Day # 2 PCP Marisela Miller MD     SUBJECTIVE:  Pt seen and examined at PRN   Meropenem (MERREM) 500 mg in sodium chloride 0.9% 100 mL  mg Intravenous Q8H   HYDROcodone-acetaminophen (NORCO) 5-325 MG per tab 1 tablet 1 tablet Oral Q4H PRN   Or      HYDROcodone-acetaminophen (NORCO) 5-325 MG per tab 2 tablet 2 tablet Varinder Johnson

## 2019-07-24 NOTE — PROGRESS NOTES
Pt in CT 2 at 1650. Consent for CT-Guided Pleural Fluid Removal, Left signed. Pt made to lie on right side, hooked up to monitor with O2 per NC at 2 LPM.  Time out called, pt medicated with Fentanyl only due to NPO status.   140 ml of pleural fluid aspira

## 2019-07-24 NOTE — PLAN OF CARE
Problem: Patient Centered Care  Goal: Patient preferences are identified and integrated in the patient's plan of care  Description  Interventions:  - Provide timely, complete, and accurate information to patient/family  - Incorporate patient and family k Modify environment to reduce risk of injury  - Provide assistive devices as appropriate  - Consider OT/PT consult to assist with strengthening/mobility  - Encourage toileting schedule  Outcome: Progressing   Patient post op day #2 c/o pain Toradol and Norc

## 2019-07-25 LAB
ANION GAP SERPL CALC-SCNC: 7 MMOL/L (ref 0–18)
BASOPHILS # BLD AUTO: 0.07 X10(3) UL (ref 0–0.2)
BASOPHILS NFR BLD AUTO: 0.3 %
BUN BLD-MCNC: 9 MG/DL (ref 7–18)
BUN/CREAT SERPL: 10.5 (ref 10–20)
CALCIUM BLD-MCNC: 8.3 MG/DL (ref 8.5–10.1)
CHLORIDE SERPL-SCNC: 106 MMOL/L (ref 98–112)
CO2 SERPL-SCNC: 27 MMOL/L (ref 21–32)
CREAT BLD-MCNC: 0.86 MG/DL (ref 0.7–1.3)
DEPRECATED RDW RBC AUTO: 50.2 FL (ref 35.1–46.3)
EOSINOPHIL # BLD AUTO: 0.46 X10(3) UL (ref 0–0.7)
EOSINOPHIL NFR BLD AUTO: 2.3 %
ERYTHROCYTE [DISTWIDTH] IN BLOOD BY AUTOMATED COUNT: 15.9 % (ref 11–15)
GLUCOSE BLD-MCNC: 92 MG/DL (ref 70–99)
HCT VFR BLD AUTO: 25.7 % (ref 39–53)
HGB BLD-MCNC: 8.2 G/DL (ref 13–17.5)
IMM GRANULOCYTES # BLD AUTO: 0.15 X10(3) UL (ref 0–1)
IMM GRANULOCYTES NFR BLD: 0.7 %
LYMPHOCYTES # BLD AUTO: 2.72 X10(3) UL (ref 1–4)
LYMPHOCYTES NFR BLD AUTO: 13.4 %
MCH RBC QN AUTO: 27.2 PG (ref 26–34)
MCHC RBC AUTO-ENTMCNC: 31.9 G/DL (ref 31–37)
MCV RBC AUTO: 85.4 FL (ref 80–100)
MONOCYTES # BLD AUTO: 1.53 X10(3) UL (ref 0.1–1)
MONOCYTES NFR BLD AUTO: 7.6 %
NEUTROPHILS # BLD AUTO: 15.31 X10 (3) UL (ref 1.5–7.7)
NEUTROPHILS # BLD AUTO: 15.31 X10(3) UL (ref 1.5–7.7)
NEUTROPHILS NFR BLD AUTO: 75.7 %
OSMOLALITY SERPL CALC.SUM OF ELEC: 288 MOSM/KG (ref 275–295)
PLATELET # BLD AUTO: 466 10(3)UL (ref 150–450)
POTASSIUM SERPL-SCNC: 4.1 MMOL/L (ref 3.5–5.1)
RBC # BLD AUTO: 3.01 X10(6)UL (ref 4.3–5.7)
SODIUM SERPL-SCNC: 140 MMOL/L (ref 136–145)
WBC # BLD AUTO: 20.2 X10(3) UL (ref 4–11)

## 2019-07-25 NOTE — PLAN OF CARE
Pt A&Ox4. L chest tube -20 low continuous suction. Left flank dean drain,  Q4 20 ml saline flushes. Pt ambulated in hallway. During ambulation patient had severe pain. VS obtain and drain/ chest tube assessed.  Pt given 4mg morphine x1 with immediate reli Monitor for opioid side effects  - Notify MD/LIP if interventions unsuccessful or patient reports new pain  - Anticipate increased pain with activity and pre-medicate as appropriate  Outcome: Progressing     Problem: RISK FOR INFECTION - ADULT  Goal: Absen

## 2019-07-25 NOTE — DIETARY NOTE
ADULT NUTRITION INITIAL ASSESSMENT    Pt is at moderate nutrition risk. Pt does not meet malnutrition criteria.       RECOMMENDATIONS TO MD:  None at this time     NUTRITION DIAGNOSIS/PROBLEM:  Unintentional weight loss related to increased energy needs as Weight   07/23/19 1007 91.5 kg (201 lb 11.2 oz)   07/22/19 1103 88.2 kg (194 lb 8 oz)   07/17/19 1153 89.4 kg (197 lb)     GASTROINTESTINAL: constipation  Last BM reported 7/21. Colace give.   encourege small sips of fluids    FOOD/NUTRITION RELATED HISTOR

## 2019-07-25 NOTE — DISCHARGE SUMMARY
[place] [facility]    Discharge Summary    Andrew Slade Patient Status:  Inpatient    1965 MRN [id number]   Location [facility] Attending No att. providers found   Hosp Day # 4 PCP Flower Hernández MD     Date of Admission: 20 by IR.  Draining well now. Continued on IV antibiotic per ID recommendation. Repeat cultures from this admission negative so far. Surgery followed, recommended for surgical debridement in 2 to 3 weeks.   4.  Infected pancreatic pseudocyst  5.  History of

## 2019-07-25 NOTE — PAYOR COMM NOTE
--------------  CONTINUED STAY REVIEW----REQUESTING ADDITIONAL DAY 7/25      Payor: Tena PATEL/ARSEN  Subscriber #:  [id number]  Authorization Number: [id number]    Admit date: 7/22/19  Admit time: 6652    Admitting Physician: Narinder Otto MD  Attending Ph Vancomycin HCl (VANCOCIN) 1,500 mg in sodium chloride 0.9% 500 mL IVPB 15 mg/kg Intravenous Q12H   0.9% NaCl infusion   Intravenous Continuous   Midazolam HCl (VERSED) 2 MG/2ML injection 1 mg 1 mg Intravenous Q5 Min PRN   fentaNYL citrate (SUBLIMAZE) 0.05 CT chest, abdomen, pelvis ordered per surgery.  CT chest showing left-sided hydropneumothorax with pleural fluid loculation.  IR consulted, fluid drained with placement of a catheter.   Continues to have fever  .  WBC up to 20 today  Follow-up cultures from 7/25/2019 0316 Given 2 tablet Oral Camilo Cochran, RN      Meropenem (MERREM) 500 mg in sodium chloride 0.9% 100 mL MBP     Date Action Dose Route User    7/25/2019 0957 New Bag 500 mg Intravenous Stephie Mcdermott RN    7/25/2019 0222 New Bag 500 mg Int

## 2019-07-25 NOTE — PLAN OF CARE
Problem: Patient Centered Care  Goal: Patient preferences are identified and integrated in the patient's plan of care  Description  Interventions:  - Provide timely, complete, and accurate information to patient/family  - Incorporate patient and family k Modify environment to reduce risk of injury  - Provide assistive devices as appropriate  - Consider OT/PT consult to assist with strengthening/mobility  - Encourage toileting schedule  Outcome: Progressing   Patient had a  fever 102.8 F in the evening , MD

## 2019-07-25 NOTE — PROGRESS NOTES
[facility] ID PROGRESS NOTE    Loree Prado Patient Status:  Inpatient    1965 MRN [id number]   Location [facility] 4W/SW/SE Attending Lovely Dhaliwal, [facility]  Day # 3 PCP Johanny Urbano MD     Subjective:  Tmax 102.8 overnight.  Had CT placed overnight pain, CT at that that time peripancreatic inflammation with fluid/gas c/f possible necrosis s/p IR drainage on 6/3 growing Amp-S E. faecalis, S.anginosus, Prevotella, sent on IV dapto and invanz for four week course with plans for eventual surgery with

## 2019-07-25 NOTE — PROGRESS NOTES
[place] [facility]    Progress Note    Michelle Reece Patient Status:  Inpatient    1965 MRN [id number]   Location [facility] 4W/SW/SE Attending Jason Vargas, [address] [facility] Se Day # 3 PCP Violeta Stanley MD     SUBJECTIVE:  Pt seen and examined at Albuterol Sulfate  (90 Base) MCG/ACT inhaler 2 puff 2 puff Inhalation Q4H PRN   Meropenem (MERREM) 500 mg in sodium chloride 0.9% 100 mL  mg Intravenous Q8H   HYDROcodone-acetaminophen (NORCO) 5-325 MG per tab 1 tablet 1 tablet Oral Q4H PRN

## 2019-07-26 ENCOUNTER — APPOINTMENT (OUTPATIENT)
Dept: GENERAL RADIOLOGY | Facility: HOSPITAL | Age: 54
DRG: 405 | End: 2019-07-26
Attending: RADIOLOGY
Payer: COMMERCIAL

## 2019-07-26 LAB
BASOPHILS # BLD AUTO: 0.06 X10(3) UL (ref 0–0.2)
BASOPHILS NFR BLD AUTO: 0.3 %
BLOOD TYPE BARCODE: 5100
CREAT BLD-MCNC: 0.88 MG/DL (ref 0.7–1.3)
DEPRECATED RDW RBC AUTO: 48.2 FL (ref 35.1–46.3)
EOSINOPHIL # BLD AUTO: 0.41 X10(3) UL (ref 0–0.7)
EOSINOPHIL NFR BLD AUTO: 2.3 %
ERYTHROCYTE [DISTWIDTH] IN BLOOD BY AUTOMATED COUNT: 15.9 % (ref 11–15)
HCT VFR BLD AUTO: 25.1 % (ref 39–53)
HGB BLD-MCNC: 8 G/DL (ref 13–17.5)
IMM GRANULOCYTES # BLD AUTO: 0.24 X10(3) UL (ref 0–1)
IMM GRANULOCYTES NFR BLD: 1.3 %
LYMPHOCYTES # BLD AUTO: 2.84 X10(3) UL (ref 1–4)
LYMPHOCYTES NFR BLD AUTO: 15.7 %
MCH RBC QN AUTO: 26.6 PG (ref 26–34)
MCHC RBC AUTO-ENTMCNC: 31.9 G/DL (ref 31–37)
MCV RBC AUTO: 83.4 FL (ref 80–100)
MONOCYTES # BLD AUTO: 1.53 X10(3) UL (ref 0.1–1)
MONOCYTES NFR BLD AUTO: 8.4 %
NEUTROPHILS # BLD AUTO: 13.05 X10 (3) UL (ref 1.5–7.7)
NEUTROPHILS # BLD AUTO: 13.05 X10(3) UL (ref 1.5–7.7)
NEUTROPHILS NFR BLD AUTO: 72 %
PLATELET # BLD AUTO: 509 10(3)UL (ref 150–450)
RBC # BLD AUTO: 3.01 X10(6)UL (ref 4.3–5.7)
VANCOMYCIN TROUGH SERPL-MCNC: 9 UG/ML (ref 10–20)
WBC # BLD AUTO: 18.1 X10(3) UL (ref 4–11)

## 2019-07-26 PROCEDURE — 71045 X-RAY EXAM CHEST 1 VIEW: CPT | Performed by: RADIOLOGY

## 2019-07-26 RX ORDER — IPRATROPIUM BROMIDE AND ALBUTEROL SULFATE 2.5; .5 MG/3ML; MG/3ML
3 SOLUTION RESPIRATORY (INHALATION) EVERY 6 HOURS PRN
Status: DISCONTINUED | OUTPATIENT
Start: 2019-07-26 | End: 2019-08-01

## 2019-07-26 NOTE — PROGRESS NOTES
[place] [facility]    Progress Note    Puma Miramontes Patient Status:  Inpatient    1965 MRN [id number]   Location [facility] 4W/SW/SE Attending Morton Epley, [address] [facility] Day # 4 PCP Nicole Matthews MD     SUBJECTIVE:  Pt seen and examined at Base) MCG/ACT inhaler 2 puff 2 puff Inhalation Q4H PRN   Meropenem (MERREM) 500 mg in sodium chloride 0.9% 100 mL  mg Intravenous Q8H   HYDROcodone-acetaminophen (NORCO) 5-325 MG per tab 1 tablet 1 tablet Oral Q4H PRN   Or      HYDROcodone-acetamino

## 2019-07-26 NOTE — PROGRESS NOTES
[facility]    Progress Note    Antionettechiara Chingstefanie Patient Status:  Inpatient    1965 MRN [id number]   Location [facility] 4W/SW/SE Attending Melinda Meaz, [facility] Day # 4 PCP Medina Acosta MD     Assessment and Plan:      51 y/o man Output 1248 235 840       Net I/O     60 1252 -660          Exam:   /69 (BP Location: Right arm)   Pulse 97   Temp 99 °F (37.2 °C) (Oral)   Resp 24   Ht 6' (1.829 m)   Wt 201 lb 11.2 oz (91.5 kg)   SpO2 94%   BMI 27.36 kg/m²   Gen:  NAD, sitting up i on 7/26/2019 at 12:17     Approved by (CST): Gris Morales MD on 7/26/2019 at 12:21                    Irina Espinoza MD  7/26/2019

## 2019-07-26 NOTE — PROGRESS NOTES
[facility] dosing service    Follow-up Pharmacokinetic Consult for Vancomycin Dosing     Nate Zapata is a 52year old male who is being treated for pneumonia. Patient is on day 3 of Vancomycin and add is currently receiving 1.5 gm IV Q 12 hours.   Goal in his care.     Colleen Fontanez [facility]  7/26/2019  6:30 AM  [address]: [phone]

## 2019-07-26 NOTE — PROGRESS NOTES
[facility] ID PROGRESS NOTE    Teodoro Mensah Patient Status:  Inpatient    1965 MRN [id number]   Location [facility] 4W/SW/SE Attending Narinder Otto, [address] [facility]  Day # 4 PCP Kitty Rider MD     Subjective:  Tmax 99.8. CT draining.  Having pain when ambu ertapenem through 5/26), [address] [place] [address] admission 6/1-6/7 with worsening abdominal pain, CT at that that time peripancreatic inflammation with fluid/gas c/f possible necrosis s/p IR drainage on 6/3 growing Amp-S E. faecalis, S.anginosus, Prevotella, sent on IV d

## 2019-07-26 NOTE — PLAN OF CARE
TPA and Pulmozyme given per protocol via chest tube under supervision of Erika Carter RN. Pt complains of mild discomfort during infusion at CT insertion site. Pt premedicated with Morphine 4 mg IVP prior to administration. CT capped until 1630.  With then ope

## 2019-07-26 NOTE — PAYOR COMM NOTE
--------------  CONTINUED STAY REVIEW    Payor: [address] [place] [address] JORGE/ARSEN  Subscriber #:  [id number]  Authorization Number: [id number]    Admit date: 7/22/19  Admit time: 56    Admitting Physician: Joshua Barton MD  Attending Physician:  Joshua Barton MD  Primary Car necrosis        ASSESSMENT:     Antibiotics: Vancomycin, meropenem  Daptomycin     52year old male with a history of pancreatitis c/b ESBL Klebsiella bacteremia at [facility] 5/2019 (d/c on 2 week IV ertapenem through 5/26), [address] [place] [address] admission 6/1-6 7/26/2019 0755 Given 2 puff Inhalation Herbie West, RN    7/25/2019 2149 Given 2 puff Inhalation Ana Laura Garcia RN    7/25/2019 1731 Given 2 puff Inhalation Jeaneashwin West, VANNESSA      docusate sodium (COLACE) cap 100 mg     Date Action Dose Route 07/25/19 1942 — 101 25 108/75 94 % — — — TT   07/25/19 1837 99.8 °F (37.7 °C) — — — — — — — SM   07/25/19 1743 99.2 °F (37.3 °C) — — — — — — —    07/25/19 1312 98.6 °F (37 °C) — — — — — — —    07/25/19 1228 100.4 °F (38 °C) 99 28Abnormal  103/71 96 %

## 2019-07-26 NOTE — PLAN OF CARE
Problem: Patient Centered Care  Goal: Patient preferences are identified and integrated in the patient's plan of care  Description  Interventions:  - What would you like us to know as we care for you?  - Provide timely, complete, and accurate information for assistance with activity based on assessment  - Modify environment to reduce risk of injury  - Provide assistive devices as appropriate  - Consider OT/PT consult to assist with strengthening/mobility  - Encourage toileting schedule  Outcome: Progressin

## 2019-07-27 ENCOUNTER — APPOINTMENT (OUTPATIENT)
Dept: GENERAL RADIOLOGY | Facility: HOSPITAL | Age: 54
DRG: 405 | End: 2019-07-27
Attending: RADIOLOGY
Payer: COMMERCIAL

## 2019-07-27 LAB — VANCOMYCIN TROUGH SERPL-MCNC: 16.3 UG/ML (ref 10–20)

## 2019-07-27 PROCEDURE — 71045 X-RAY EXAM CHEST 1 VIEW: CPT | Performed by: RADIOLOGY

## 2019-07-27 NOTE — PROGRESS NOTES
[place] [facility]    Progress Note    Sharridaryl Nixin Patient Status:  Inpatient    1965 MRN [id number]   Location [facility] 4W/SW/SE Attending Ty Cabrera, [address] [facility]  Day # 5 PCP Clydene Brittle, MD     SUBJECTIVE:  Pt seen and examined at tablet 2 tablet Oral Q4H PRN   morphINE sulfate (PF) 2 MG/ML injection 2 mg 2 mg Intravenous Q2H PRN   Or      morphINE sulfate (PF) 4 MG/ML injection 4 mg 4 mg Intravenous Q2H PRN   Or      morphINE sulfate (PF) 4 MG/ML injection 8 mg 8 mg Intravenous Q2H

## 2019-07-27 NOTE — PROGRESS NOTES
[facility] ID PROGRESS NOTE    Sonia Mcgregor Patient Status:  Inpatient    1965 MRN [id number]   Location [facility] 4W/SW/SE Attending Bhavin Barber,  [facility] Se Day # 5 PCP Dae Pimentel MD     Subjective:  Last febrile yesterday evening to 101. NAD.  Jose Campbell 5/2019 (d/c on 2 week IV ertapenem through 5/26), [facility] admission 6/1-6/7 with worsening abdominal pain, CT at that that time peripancreatic inflammation with fluid/gas c/f possible necrosis s/p IR drainage on 6/3 growing Amp-S E. faecalis, S.anginosus,

## 2019-07-27 NOTE — PLAN OF CARE
Problem: Patient Centered Care  Goal: Patient preferences are identified and integrated in the patient's plan of care  Description  Interventions:  - What would you like us to know as we care for you?   - Provide timely, complete, and accurate informatio pain. Repeat CXR ordered for the morning. Febrile. Continue antibiotics.

## 2019-07-27 NOTE — PROGRESS NOTES
[place] [facility]    Progress Note    Puma Miramontes Patient Status:  Inpatient    1965 MRN [id number]   Location [facility] 4W/SW/SE Attending Morton Epley, [facility] Day # 5 PCP Nicole Matthews MD     Assessment and Plan:      51 y/o man (Closed/Suction Drain Left Back 24 Fr.) 115 115 --    Other  --  0  --    Other -- 0 --    Diaper Weight Mixed Urine/Stool (g) -- 0 --    Naima-Pad Occurrence -- 0 x --    Stool  --  0  --    Stool Occurrence -- 0 x --    Stool -- 0 --    Diaper Weight with 7/27/2019 at 7:26     Approved by (CST): Adria Fabry, MD on 7/27/2019 at 7:27          Xr Chest Ap Portable  (cpt=71045)    Result Date: 7/26/2019  CONCLUSION:  1. Mild cardiomegaly. Tortuous atherosclerotic aorta 2.  Left perihilar and lower lobe parenchy

## 2019-07-28 ENCOUNTER — APPOINTMENT (OUTPATIENT)
Dept: GENERAL RADIOLOGY | Facility: HOSPITAL | Age: 54
DRG: 405 | End: 2019-07-28
Attending: RADIOLOGY
Payer: COMMERCIAL

## 2019-07-28 LAB
ANION GAP SERPL CALC-SCNC: 7 MMOL/L (ref 0–18)
BASOPHILS # BLD AUTO: 0.06 X10(3) UL (ref 0–0.2)
BASOPHILS NFR BLD AUTO: 0.5 %
BUN BLD-MCNC: 9 MG/DL (ref 7–18)
BUN/CREAT SERPL: 10.6 (ref 10–20)
CALCIUM BLD-MCNC: 8.2 MG/DL (ref 8.5–10.1)
CHLORIDE SERPL-SCNC: 103 MMOL/L (ref 98–112)
CO2 SERPL-SCNC: 28 MMOL/L (ref 21–32)
CREAT BLD-MCNC: 0.85 MG/DL (ref 0.7–1.3)
DEPRECATED RDW RBC AUTO: 49.4 FL (ref 35.1–46.3)
EOSINOPHIL # BLD AUTO: 0.45 X10(3) UL (ref 0–0.7)
EOSINOPHIL NFR BLD AUTO: 3.5 %
ERYTHROCYTE [DISTWIDTH] IN BLOOD BY AUTOMATED COUNT: 16.3 % (ref 11–15)
GLUCOSE BLD-MCNC: 106 MG/DL (ref 70–99)
HCT VFR BLD AUTO: 24.4 % (ref 39–53)
HGB BLD-MCNC: 7.9 G/DL (ref 13–17.5)
IMM GRANULOCYTES # BLD AUTO: 0.4 X10(3) UL (ref 0–1)
IMM GRANULOCYTES NFR BLD: 3.1 %
LYMPHOCYTES # BLD AUTO: 2.67 X10(3) UL (ref 1–4)
LYMPHOCYTES NFR BLD AUTO: 20.7 %
MCH RBC QN AUTO: 26.8 PG (ref 26–34)
MCHC RBC AUTO-ENTMCNC: 32.4 G/DL (ref 31–37)
MCV RBC AUTO: 82.7 FL (ref 80–100)
MONOCYTES # BLD AUTO: 0.91 X10(3) UL (ref 0.1–1)
MONOCYTES NFR BLD AUTO: 7.1 %
NEUTROPHILS # BLD AUTO: 8.4 X10 (3) UL (ref 1.5–7.7)
NEUTROPHILS # BLD AUTO: 8.4 X10(3) UL (ref 1.5–7.7)
NEUTROPHILS NFR BLD AUTO: 65.1 %
OSMOLALITY SERPL CALC.SUM OF ELEC: 285 MOSM/KG (ref 275–295)
PLATELET # BLD AUTO: 581 10(3)UL (ref 150–450)
POTASSIUM SERPL-SCNC: 3.9 MMOL/L (ref 3.5–5.1)
RBC # BLD AUTO: 2.95 X10(6)UL (ref 4.3–5.7)
SODIUM SERPL-SCNC: 138 MMOL/L (ref 136–145)
WBC # BLD AUTO: 12.9 X10(3) UL (ref 4–11)

## 2019-07-28 PROCEDURE — 71045 X-RAY EXAM CHEST 1 VIEW: CPT | Performed by: RADIOLOGY

## 2019-07-28 NOTE — PROGRESS NOTES
[place] [facility]    Progress Note    Thermon Sleight Patient Status:  Inpatient    1965 MRN [id number]   Location [facility] 4W/SW/SE Attending Colten Schroeder, [facility] Day # 6 PCP Caroline Clancy MD     SUBJECTIVE:  Pt seen and examined at Albuterol Sulfate  (90 Base) MCG/ACT inhaler 2 puff 2 puff Inhalation Q4H PRN   Meropenem (MERREM) 500 mg in sodium chloride 0.9% 100 mL  mg Intravenous Q8H   HYDROcodone-acetaminophen (NORCO) 5-325 MG per tab 1 tablet 1 tablet Oral Q4H PRN

## 2019-07-28 NOTE — PROGRESS NOTES
[facility]    [facility] Note    Adelaide Million Patient Status:  Inpatient    1965 MRN [id number]   Location [facility] 4W/SW/SE Attending Carolyn Amaro MD   Hosp Day # 6 PCP Sally Cortez MD     Assessment and Plan:       Idiop Output (mL) (Chest Tube Left) 800 180 --    Total Output 2690 1525 --       Net I/O     -918 136 --          Exam:   /67 (BP Location: Right arm)   Pulse 80   Temp 99 °F (37.2 °C) (Oral)   Resp 18   Ht 6' (1.829 m)   Wt 201 lb 11.2 oz (91.5 kg)   SpO 7/27/2019 at 7:26     Approved by (CST): Adria Fabry, MD on 7/27/2019 at 7:27          Xr Chest Ap Portable  (cpt=71045)    Result Date: 7/26/2019  CONCLUSION:  1. Mild cardiomegaly. Tortuous atherosclerotic aorta 2.  Left perihilar and lower lobe parenchy

## 2019-07-28 NOTE — PROGRESS NOTES
[facility] dosing service    Follow-up Pharmacokinetic Consult for Vancomycin Dosing     Luis Gotti is a 52year old male who is being treated for Infected pancreatic pseudocyst  .   Patient is on day 3 of Vancomycin and add is currently receiving 2 gm level 15-20 ug/mL. 3.  Pharmacy will need Scr daily while on Vancomycin to assess renal function. 4.  Pharmacy will follow and adjust as necessary. We appreciate the opportunity to assist in his care.     Houston Wagner, PharmD  7/27/2019  7:45 PM

## 2019-07-29 ENCOUNTER — APPOINTMENT (OUTPATIENT)
Dept: CT IMAGING | Facility: HOSPITAL | Age: 54
DRG: 405 | End: 2019-07-29
Attending: SURGERY
Payer: COMMERCIAL

## 2019-07-29 ENCOUNTER — APPOINTMENT (OUTPATIENT)
Dept: GENERAL RADIOLOGY | Facility: HOSPITAL | Age: 54
DRG: 405 | End: 2019-07-29
Attending: CLINICAL NURSE SPECIALIST
Payer: COMMERCIAL

## 2019-07-29 PROCEDURE — 74177 CT ABD & PELVIS W/CONTRAST: CPT | Performed by: SURGERY

## 2019-07-29 PROCEDURE — 71045 X-RAY EXAM CHEST 1 VIEW: CPT | Performed by: CLINICAL NURSE SPECIALIST

## 2019-07-29 PROCEDURE — 71260 CT THORAX DX C+: CPT | Performed by: SURGERY

## 2019-07-29 NOTE — PROGRESS NOTES
[facility] ID PROGRESS NOTE    Olga Thapa Patient Status:  Inpatient    1965 MRN [id number]   Location [facility] 4W/SW/SE Attending Agustina Anderson, [address] [facility] Se Day # 7 PCP Jersey Middleton MD     Subjective:  Tmax 100.3 last night.  Afebrile this AM. Pain ESBL Klebsiella bacteremia at [facility] 5/2019 (d/c on 2 week IV ertapenem through 5/26), [address] [facility] admission 6/1-6/7 with worsening abdominal pain, CT at that that time peripancreatic inflammation with fluid/gas c/f possible necrosis s/p IR drainage

## 2019-07-29 NOTE — PAYOR COMM NOTE
--------------  CONTINUED STAY REVIEW    Payor: [address] [place] [address] POS/ARSEN  Subscriber #:  [id number]  Authorization Number: [id number]    Admit date: 7/22/19  Admit time: [address]    Admitting Physician: Svitlana Damon MD  Attending Physician:  Marlon Patel MD  Primary purulent fluid, left CT with serosanginuous fluid.     Results:            Lab Results   Component Value Date     WBC 12.9 (H) 07/28/2019     HGB 7.9 (L) 07/28/2019     HCT 24.4 (L) 07/28/2019     .0 (H) 07/28/2019     CREATSERUM 0.85 07/28/2019     Value Date     WBC 12.9 07/28/2019     HGB 7.9 07/28/2019     HCT 24.4 07/28/2019     .0 07/28/2019     CREATSERUM 0.85 07/28/2019     BUN 9 07/28/2019      07/28/2019     K 3.9 07/28/2019      07/28/2019     CO2 28.0 07/28/2019     GLU mg Rectal Daily PRN   FLEET ENEMA (FLEET) 7-19 GM/118ML enema 133 mL 1 enema Rectal Once PRN   ondansetron HCl (ZOFRAN) injection 4 mg 4 mg Intravenous Q6H PRN   Metoclopramide HCl (REGLAN) injection 10 mg 10 mg Intravenous Q6H PRN   Heparin Sodium (Porcin 07/23/19  0542 07/24/19  1009 07/25/19  0723 07/26/19  0530     --  140  --    K 4.2  --  4.1  --      --  106  --    CO2 25.0  --  27.0  --    BUN 12 11 9  --    CREATSERUM 0.95 1.17 0.86 0.88         No results for input(s): ALT, AST in the l daptomycin and meropenem PTA               -Previous cx with Amp-S E. faecalis, strep anginosus, Prevotella  # H/o ESBL Klebsiella bacteremia (5/2019 @ [facility], s/p ertapenem tx)     PLAN:     -  Continue on vancomycin and meropenem at this time.    -  JACKELYN f sodium chloride 0.9% 500 mL IVPB     Date Action Dose Route User    7/28/2019 210 New Bag 2000 mg Intravenous Collette Fanny, RN

## 2019-07-29 NOTE — PROGRESS NOTES
[facility]    [facility] Note    Pilar Kirkland Patient Status:  Inpatient    1965 MRN [id number]   Location [facility] 4W/SW/SE Attending Nhung Pettit MD   Hosp Day # 7 PCP Contreras Trujillo MD     SUBJECTIVE:  Pt seen and examine tablet 2 tablet Oral Q4H PRN   morphINE sulfate (PF) 2 MG/ML injection 2 mg 2 mg Intravenous Q2H PRN   Or      morphINE sulfate (PF) 4 MG/ML injection 4 mg 4 mg Intravenous Q2H PRN   Or      morphINE sulfate (PF) 4 MG/ML injection 8 mg 8 mg Intravenous Q2H

## 2019-07-29 NOTE — PROGRESS NOTES
[place] [facility] - [facility]  Progress Note    Jeremy Barbosa Patient Status:  Inpatient    1965 MRN [id number]   Location [facility] 4W/SW/SE Attending Juvenal Ham MD   Hosp Day # 7 PCP Nathaniel Joe MD       Subjective:   Feels well.   Ronny Frye findings as above. Dictated by (CST): Ivanna Lee MD on 7/29/2019 at 10:45     Approved by (CST): Ivanna Lee MD on 7/29/2019 at 11:11          Xr Chest Ap Portable  (cpt=71045)    Result Date: 7/28/2019  CONCLUSION:  1.  Small bore left-sided ch

## 2019-07-29 NOTE — PROGRESS NOTES
[place] [facility] - [facility]    Progress Note    Bernell Denver Patient Status:  Inpatient    1965 MRN [id number]   Location [facility] 4W/SW/SE Attending Marlon Patel MD   Hosp Day # 7 PCP [place] Guillermo MD     Assessment and Plan:       Idiop 24.4 (L) 07/28/2019    .0 (H) 07/28/2019    CREATSERUM 0.85 07/28/2019    BUN 9 07/28/2019     07/28/2019    K 3.9 07/28/2019     07/28/2019    CO2 28.0 07/28/2019     (H) 07/28/2019    CA 8.2 (L) 07/28/2019    ALB 3.0 (L) 06/28/2

## 2019-07-30 ENCOUNTER — APPOINTMENT (OUTPATIENT)
Dept: CT IMAGING | Facility: HOSPITAL | Age: 54
DRG: 405 | End: 2019-07-30
Attending: CLINICAL NURSE SPECIALIST
Payer: COMMERCIAL

## 2019-07-30 LAB
ANION GAP SERPL CALC-SCNC: 8 MMOL/L (ref 0–18)
BASOPHILS # BLD: 0 X10(3) UL (ref 0–0.2)
BASOPHILS NFR BLD: 0 %
BUN BLD-MCNC: 10 MG/DL (ref 7–18)
BUN/CREAT SERPL: 10.9 (ref 10–20)
CALCIUM BLD-MCNC: 8.6 MG/DL (ref 8.5–10.1)
CHLORIDE SERPL-SCNC: 105 MMOL/L (ref 98–112)
CO2 SERPL-SCNC: 25 MMOL/L (ref 21–32)
CREAT BLD-MCNC: 0.92 MG/DL (ref 0.7–1.3)
DEPRECATED RDW RBC AUTO: 50.7 FL (ref 35.1–46.3)
EOSINOPHIL # BLD: 0 X10(3) UL (ref 0–0.7)
EOSINOPHIL NFR BLD: 0 %
ERYTHROCYTE [DISTWIDTH] IN BLOOD BY AUTOMATED COUNT: 16.6 % (ref 11–15)
GLUCOSE BLD-MCNC: 97 MG/DL (ref 70–99)
HCT VFR BLD AUTO: 27 % (ref 39–53)
HGB BLD-MCNC: 8.6 G/DL (ref 13–17.5)
LYMPHOCYTES NFR BLD: 1.75 X10(3) UL (ref 1–4)
LYMPHOCYTES NFR BLD: 12 %
MCH RBC QN AUTO: 26.7 PG (ref 26–34)
MCHC RBC AUTO-ENTMCNC: 31.9 G/DL (ref 31–37)
MCV RBC AUTO: 83.9 FL (ref 80–100)
MONOCYTES # BLD: 1.17 X10(3) UL (ref 0.1–1)
MONOCYTES NFR BLD: 8 %
MORPHOLOGY: NORMAL
NEUTROPHILS # BLD AUTO: 9.4 X10 (3) UL (ref 1.5–7.7)
NEUTROPHILS NFR BLD: 77 %
NEUTS BAND NFR BLD: 3 %
NEUTS HYPERSEG # BLD: 11.68 X10(3) UL (ref 1.5–7.7)
OSMOLALITY SERPL CALC.SUM OF ELEC: 285 MOSM/KG (ref 275–295)
PLATELET # BLD AUTO: 867 10(3)UL (ref 150–450)
PLATELET MORPHOLOGY: NORMAL
POTASSIUM SERPL-SCNC: 4.7 MMOL/L (ref 3.5–5.1)
RBC # BLD AUTO: 3.22 X10(6)UL (ref 4.3–5.7)
SODIUM SERPL-SCNC: 138 MMOL/L (ref 136–145)
TOTAL CELLS COUNTED: 100
WBC # BLD AUTO: 14.6 X10(3) UL (ref 4–11)

## 2019-07-30 PROCEDURE — 99254 IP/OBS CNSLTJ NEW/EST MOD 60: CPT | Performed by: INTERNAL MEDICINE

## 2019-07-30 PROCEDURE — 99152 MOD SED SAME PHYS/QHP 5/>YRS: CPT | Performed by: CLINICAL NURSE SPECIALIST

## 2019-07-30 PROCEDURE — 0F9G30Z DRAINAGE OF PANCREAS WITH DRAINAGE DEVICE, PERCUTANEOUS APPROACH: ICD-10-PCS | Performed by: RADIOLOGY

## 2019-07-30 PROCEDURE — 99153 MOD SED SAME PHYS/QHP EA: CPT | Performed by: CLINICAL NURSE SPECIALIST

## 2019-07-30 PROCEDURE — 10030 IMG GID FLU COLL DRG SFT TIS: CPT | Performed by: CLINICAL NURSE SPECIALIST

## 2019-07-30 RX ORDER — HYDROCODONE BITARTRATE AND ACETAMINOPHEN 10; 325 MG/1; MG/1
2 TABLET ORAL EVERY 4 HOURS PRN
Status: DISCONTINUED | OUTPATIENT
Start: 2019-07-30 | End: 2019-08-01

## 2019-07-30 RX ORDER — MORPHINE SULFATE 4 MG/ML
INJECTION, SOLUTION INTRAMUSCULAR; INTRAVENOUS
Status: COMPLETED
Start: 2019-07-30 | End: 2019-07-30

## 2019-07-30 RX ORDER — MORPHINE SULFATE 4 MG/ML
INJECTION, SOLUTION INTRAMUSCULAR; INTRAVENOUS
Status: DISPENSED
Start: 2019-07-30 | End: 2019-07-31

## 2019-07-30 RX ORDER — HYDROCODONE BITARTRATE AND ACETAMINOPHEN 10; 325 MG/1; MG/1
1 TABLET ORAL EVERY 4 HOURS PRN
Status: DISCONTINUED | OUTPATIENT
Start: 2019-07-30 | End: 2019-08-01

## 2019-07-30 RX ORDER — ACETAMINOPHEN 325 MG/1
650 TABLET ORAL EVERY 4 HOURS PRN
Status: DISCONTINUED | OUTPATIENT
Start: 2019-07-30 | End: 2019-08-01

## 2019-07-30 RX ORDER — MIDAZOLAM HYDROCHLORIDE 1 MG/ML
INJECTION INTRAMUSCULAR; INTRAVENOUS
Status: DISCONTINUED
Start: 2019-07-30 | End: 2019-07-30 | Stop reason: WASHOUT

## 2019-07-30 RX ORDER — SODIUM CHLORIDE 9 MG/ML
INJECTION, SOLUTION INTRAVENOUS CONTINUOUS
Status: DISCONTINUED | OUTPATIENT
Start: 2019-07-30 | End: 2019-08-01

## 2019-07-30 RX ORDER — MORPHINE SULFATE 4 MG/ML
4 INJECTION, SOLUTION INTRAMUSCULAR; INTRAVENOUS EVERY 2 HOUR PRN
Status: DISCONTINUED | OUTPATIENT
Start: 2019-07-30 | End: 2019-08-01

## 2019-07-30 RX ORDER — MORPHINE SULFATE 2 MG/ML
1 INJECTION, SOLUTION INTRAMUSCULAR; INTRAVENOUS EVERY 2 HOUR PRN
Status: DISCONTINUED | OUTPATIENT
Start: 2019-07-30 | End: 2019-08-01

## 2019-07-30 RX ORDER — FLUMAZENIL 0.1 MG/ML
0.2 INJECTION, SOLUTION INTRAVENOUS AS NEEDED
Status: DISCONTINUED | OUTPATIENT
Start: 2019-07-30 | End: 2019-08-01

## 2019-07-30 RX ORDER — MIDAZOLAM HYDROCHLORIDE 1 MG/ML
1 INJECTION INTRAMUSCULAR; INTRAVENOUS EVERY 5 MIN PRN
Status: DISCONTINUED | OUTPATIENT
Start: 2019-07-30 | End: 2019-08-01

## 2019-07-30 RX ORDER — NALOXONE HYDROCHLORIDE 0.4 MG/ML
80 INJECTION, SOLUTION INTRAMUSCULAR; INTRAVENOUS; SUBCUTANEOUS AS NEEDED
Status: DISCONTINUED | OUTPATIENT
Start: 2019-07-30 | End: 2019-08-01

## 2019-07-30 RX ORDER — MORPHINE SULFATE 2 MG/ML
2 INJECTION, SOLUTION INTRAMUSCULAR; INTRAVENOUS EVERY 2 HOUR PRN
Status: DISCONTINUED | OUTPATIENT
Start: 2019-07-30 | End: 2019-08-01

## 2019-07-30 RX ORDER — MIDAZOLAM HYDROCHLORIDE 1 MG/ML
INJECTION INTRAMUSCULAR; INTRAVENOUS
Status: DISPENSED
Start: 2019-07-30 | End: 2019-07-31

## 2019-07-30 NOTE — PAYOR COMM NOTE
--------------  CONTINUED STAY REVIEW    Payor: [address] [place] [address] POS/ARSEN  Subscriber #:  [id number]  Authorization Number: [id number]    Admit date: 7/22/19  Admit time: [address]    Admitting Physician: Emerson Thomson MD  Attending Physician:  Abby Miller MD  Primary output  --  0  --      Emesis -- 0 --      Emesis Occurrence -- 0 x --      NG/OG Output -- 0 --      Drains  40  22  --      Output (mL) (Closed/Suction Drain Left Back 24 Fr.) 40 22 --      Other  --  0  --      Other -- 0 --      Diaper Weight Mixed Libby Huggins interval increase in a fluid collection along the left posterolateral abdominal wall soft tissues along the course of the surgical drainage catheter. 4. Drainage catheter adjacent to the pancreas with walled-off pancreatic necrosis.  Residual collections o Subcutaneous (Right Lower Abdomen) Luis Torres RN    7/29/2019 1126 Given 5000 Units Subcutaneous (Right Upper Arm) Ewa Espinoza RN      HYDROcodone-acetaminophen (NORCO) 5-325 MG per tab 2 tablet     Date Action Dose Route User    7/30/2019 0

## 2019-07-30 NOTE — PROGRESS NOTES
[facility]    Progress Note    Nandini Lara Patient Status:  Inpatient    1965 MRN [id number]   Location [facility] 4W/SW/SE Attending Khalif Hogue MD   Hosp Day # 8 PCP Gretchen Cooper MD     Assessment and Plan:     Estella Aaron Drain Left Back 24 Fr.) 40 22 --    Other  --  0  --    Other -- 0 --    Diaper Weight Mixed Urine/Stool (g) -- 0 --    Naima-Pad Occurrence -- 0 x --    Stool  --  0  --    Stool Occurrence 1 x 0 x --    Stool -- 0 --    Diaper Weight with Stool (g) -- 0 - necrosis. Residual collections of fluid and gas are unchanged. 5. Extension of peripancreatic inflammation into the left para-aortic retroperitoneum, grossly stable.   6. Dilatation of the main pulmonary artery trunk may relate to underlying pulmonary hype

## 2019-07-30 NOTE — PROGRESS NOTES
[facility] ID PROGRESS NOTE    Thermon Sleight Patient Status:  Inpatient    1965 MRN [id number]   Location [facility] 4W/SW/SE Attending Colten Schroeder MD   Hosp Day # 8 PCP Caroline Clancy MD     Subjective:  Awake, afebrile. CT pulled yesterday.  Pain co 5/2019 (d/c on 2 week IV ertapenem through 5/26), [facility] admission 6/1-6/7 with worsening abdominal pain, CT at that that time peripancreatic inflammation with fluid/gas c/f possible necrosis s/p IR drainage on 6/3 growing Amp-S E. faecalis, S.anginosus,

## 2019-07-30 NOTE — PLAN OF CARE
Patient feeling ok today, had a MYRA drain placed in IR this afternoon. Pain controlled with Norco, tolerating soft diet. IV abx and PICC in place. Possible d/c with IV abx tomorrow.      Problem: Patient Centered Care  Goal: Patient preferences are identifie behaviors that affect risk of falls.   - [place] fall precautions as indicated by assessment.  - Educate pt/family on patient safety including physical limitations  - Instruct pt to call for assistance with activity based on assessment  - Modify environme

## 2019-07-30 NOTE — PROGRESS NOTES
PROCEDURE COMPLETE. 8FR MYRA DRAIN PLACED RIGHT UPPER ABDOMEN. 10ML SPECIMEN SENT. DRAIN DRESSING APPLIED TO SITE.  REPORT TO RN

## 2019-07-30 NOTE — PROGRESS NOTES
[facility]    Progress Note    Sharri Sifuentes Patient Status:  Inpatient    1965 MRN [id number]   Location [facility] 4W/SW/SE Attending Karla iVllar MD   Hosp Day # 8 PCP Clydene Brittle, MD     SUBJECTIVE:  Pt seen and examine  MG per tab 1 tablet 1 tablet Oral Q4H PRN   Or      HYDROcodone-acetaminophen (NORCO)  MG per tab 2 tablet 2 tablet Oral Q4H PRN   morphINE sulfate (PF) 2 MG/ML injection 1 mg 1 mg Intravenous Q2H PRN   Or      morphINE sulfate (PF) 2 MG/ML in Intravenous Q6H PRN   Metoclopramide HCl (REGLAN) injection 10 mg 10 mg Intravenous Q6H PRN   Heparin Sodium (Porcine) 5000 UNIT/ML injection 5,000 Units 5,000 Units Subcutaneous 2 times per day       Assessment:   #Pancreatic necrosis status post laparosc

## 2019-07-30 NOTE — CONSULTS
[facility]    Report of Consultation    Gil Michele Patient Status:  Inpatient    1965 MRN [id number]   Location [facility] 4W/SW/SE Attending Rachel Moncada MD   [facility] Day # 8 PCP Mariana Ramon MD     Date of Admission:   Current Medications:    Current Facility-Administered Medications:  Vancomycin HCl (VANCOCIN) 2,000 mg in sodium chloride 0.9% 500 mL IVPB 2,000 mg Intravenous Q12H   ipratropium-albuterol (DUONEB) nebulizer solution 3 mL 3 mL Nebulization Q6H PRN   0. daily.   Meropenem 500 MG Intravenous Recon Soln Inject 500 mg into the vein daily. Albuterol Sulfate  (90 Base) MCG/ACT Inhalation Aero Soln Inhale 2 puffs into the lungs every 4 (four) hours as needed for Wheezing.    [] enoxaparin sodium 06/15/2019    LIP 59 (L) 07/23/2019    MG 2.0 07/23/2019    PHOS 3.8 07/23/2019    CK 63 07/23/2019         Imaging  Ct Chest+abdomen+pelvis(all Cntrst Only)(cpt=71260/60284)    Result Date: 7/29/2019  CONCLUSION:  1.  Interval placement of a left-sided per necrosis status post laparoscopic debridement  2. Left empyema  3. Left apical pneumothorax      Plan   -Patient presents with evidence of pancreatic infected pseudocyst status post debridement.   -CT chest on 7/24/2019 with small to moderate left-sided h

## 2019-07-31 DIAGNOSIS — J90 PLEURAL EFFUSION: Primary | ICD-10-CM

## 2019-07-31 LAB
BASOPHILS # BLD: 0 X10(3) UL (ref 0–0.2)
BASOPHILS NFR BLD: 0 %
DEPRECATED RDW RBC AUTO: 52 FL (ref 35.1–46.3)
EOSINOPHIL # BLD: 0 X10(3) UL (ref 0–0.7)
EOSINOPHIL NFR BLD: 0 %
ERYTHROCYTE [DISTWIDTH] IN BLOOD BY AUTOMATED COUNT: 16.7 % (ref 11–15)
HCT VFR BLD AUTO: 22.9 % (ref 39–53)
HGB BLD-MCNC: 7 G/DL (ref 13–17.5)
LYMPHOCYTES NFR BLD: 1.51 X10(3) UL (ref 1–4)
LYMPHOCYTES NFR BLD: 13 %
MCH RBC QN AUTO: 26.2 PG (ref 26–34)
MCHC RBC AUTO-ENTMCNC: 30.6 G/DL (ref 31–37)
MCV RBC AUTO: 85.8 FL (ref 80–100)
METAMYELOCYTES # BLD: 0.46 X10(3) UL
METAMYELOCYTES NFR BLD: 4 %
MONOCYTES # BLD: 0.35 X10(3) UL (ref 0.1–1)
MONOCYTES NFR BLD: 3 %
NEUTROPHILS # BLD AUTO: 7.49 X10 (3) UL (ref 1.5–7.7)
NEUTROPHILS NFR BLD: 71 %
NEUTS BAND NFR BLD: 9 %
NEUTS HYPERSEG # BLD: 9.28 X10(3) UL (ref 1.5–7.7)
NRBC BLD MANUAL-RTO: 2 %
PLATELET # BLD AUTO: 779 10(3)UL (ref 150–450)
PLATELET MORPHOLOGY: NORMAL
RBC # BLD AUTO: 2.67 X10(6)UL (ref 4.3–5.7)
TOTAL CELLS COUNTED: 100
WBC # BLD AUTO: 11.6 X10(3) UL (ref 4–11)

## 2019-07-31 PROCEDURE — 99232 SBSQ HOSP IP/OBS MODERATE 35: CPT | Performed by: INTERNAL MEDICINE

## 2019-07-31 NOTE — PROGRESS NOTES
[facility] ID PROGRESS NOTE    Ivette Arrieta Patient Status:  Inpatient    1965 MRN [id number]   Location [facility] 4W/SW/SE Attending Stacy Rendon MD   [facility] Day # 9 PCP Hasmukh Vasquez MD     Subjective:  Awake, afebrile.  Had IR drain placed yesterda 5/26), [address] [place] [address] admission 6/1-6/7 with worsening abdominal pain, CT at that that time peripancreatic inflammation with fluid/gas c/f possible necrosis s/p IR drainage on 6/3 growing Amp-S E. faecalis, S.anginosus, Prevotella, sent on IV dapto and invanz fo

## 2019-07-31 NOTE — PROGRESS NOTES
[facility]     Progress Note        Gayatri Ciaradenis Patient Status:  Inpatient    1965 MRN [id number]   Location [facility] 4W/SW/SE Attending Walker Fuentes MD   Hosp Day # 9 PCP Anna Marie Olsen MD       Subjective:   Patient see fentaNYL citrate (SUBLIMAZE) 0.05 MG/ML injection 50 mcg 50 mcg Intravenous Q5 Min PRN   Naloxone HCl (NARCAN) 0.4 MG/ML injection 80 mcg 80 mcg Intravenous PRN   Flumazenil (ROMAZICON) injection 0.2 mg 0.2 mg Intravenous PRN   Normal Saline Flush 0.9 % .0 07/31/2019       Ct Drain Abscess Subcutaneous With Catheter (cpt=10030)    Result Date: 7/30/2019  CONCLUSION:  1.  CT-guided percutaneous drainage of pancreatic abscess    Dictated by (CST): Alejandro Haney MD on 7/30/2019 at 16:14     Approv output.  -Discharge planning. Obtain chest x-ray in 1 week. We will follow-up as outpatient with chest x-ray findings.     Jumana Rm, DO  Pulmonary [address]

## 2019-07-31 NOTE — PLAN OF CARE
Monitoring output from MYRA drain and Franklin drain, refusing to let us flush the Franklin drain, flushing the MYRA drain Qshift, IV antibiotics infusing, Hgb 7.0 today and need occult stool sample, tolerating low fiber diet    Problem: Patient Centered Care  Goal: cognitive and physical deficits and behaviors that affect risk of falls.   - [place] fall precautions as indicated by assessment.  - Educate pt/family on patient safety including physical limitations  - Instruct pt to call for assistance with activity bas

## 2019-07-31 NOTE — PROGRESS NOTES
[facility]    Progress Note    Dannial Overall Patient Status:  Inpatient    1965 MRN [id number]   Location [facility] 4W/SW/SE Attending Beto Schilling MD   [facility] Day [facility] PCP Drew King MD     Assessment and Plan:       Enriqueta Herrera 2138 [id number] --       Output    Urine  0  --  --    Urine 0 -- --    Void Urine Occurrence 6 x -- 1 x    Incontinent Urine Occurrence 0 x -- --    Diaper Occurrence 0 x -- --    Diaper Weight with Urine (g) 0 -- --    Emesis/NG output  0  --  --    Emesis 0 - Junior Mitesh MD on 7/30/2019 at 16:14     Approved by (CST): Junior Mitesh MD on 7/30/2019 at 16:17          Xr Chest Ap Portable  (cpt=71045)    Result Date: 7/29/2019  CONCLUSION:  1.  Small residual left pleural effusion seen on the CT scan is not d

## 2019-07-31 NOTE — PAYOR COMM NOTE
--------------  CONTINUED STAY REVIEW    Payor: Tamia PATEL/ARSEN  Subscriber #:  [id number]  Authorization Number: [id number]    Admit date: 7/22/19  Admit time: [address] [facility]    Admitting Physician: Vini Merino MD  Attending Physician:  Rachel Moncada MD  Primary 1 mg 1 mg Intravenous Q2H PRN   Or         morphINE sulfate (PF) 2 MG/ML injection 2 mg 2 mg Intravenous Q2H PRN   Or         morphINE sulfate (PF) 4 MG/ML injection 4 mg 4 mg Intravenous Q2H PRN   Vancomycin HCl (VANCOCIN) 2,000 mg in sodium chloride 0.9% Subcutaneous 2 times per day         Assessment:   #Pancreatic necrosis status post laparoscopic debridement POD#6  #Fever and leukocytosis-leukocytosis trending down, no fever in the last 24 hours.    #Hydropneumothorax status post IR chest tube placement  Route User    7/31/2019 4977 Given 2 tablet Oral Mary Mayfield RN      HYDROcodone-acetaminophen [facility]) 5-325 MG per tab 2 tablet     Date Action Dose Route User    7/31/2019 0007 Given 2 tablet Oral Dell Rincon RN      Meropenem (MERREM) 500

## 2019-07-31 NOTE — PROGRESS NOTES
[place] [facility] - [facility]    Progress Note    Talon Guzman Patient Status:  Inpatient    1965 MRN [id number]   Location [facility] 4W/SW/SE Attending Tucker Fairbanks MD   Hosp Day # 9 PCP Alma Navarro MD     SUBJECTIVE:  Pt seen and examine Q2H PRN   Or      morphINE sulfate (PF) 4 MG/ML injection 4 mg 4 mg Intravenous Q2H PRN   Vancomycin HCl (VANCOCIN) 2,000 mg in sodium chloride 0.9% 500 mL IVPB 2,000 mg Intravenous Q12H   ipratropium-albuterol (DUONEB) nebulizer solution 3 mL 3 mL Nebuliz and leukocytosis-leukocytosis trending down, no fever in the last 24 hours.  #Hydropneumothorax status post IR chest tube placement pleural fluid cultures growing gram-positive roxanne. Minimal output from chest tube.   Repeat CT chest showing persistent locu

## 2019-08-01 VITALS
HEART RATE: 90 BPM | SYSTOLIC BLOOD PRESSURE: 126 MMHG | HEIGHT: 72 IN | WEIGHT: 192 LBS | OXYGEN SATURATION: 95 % | DIASTOLIC BLOOD PRESSURE: 70 MMHG | RESPIRATION RATE: 18 BRPM | TEMPERATURE: 98 F | BODY MASS INDEX: 26.01 KG/M2

## 2019-08-01 LAB
BASOPHILS # BLD: 0 X10(3) UL (ref 0–0.2)
BASOPHILS NFR BLD: 0 %
DEPRECATED RDW RBC AUTO: 51.9 FL (ref 35.1–46.3)
EOSINOPHIL # BLD: 0.23 X10(3) UL (ref 0–0.7)
EOSINOPHIL NFR BLD: 2 %
ERYTHROCYTE [DISTWIDTH] IN BLOOD BY AUTOMATED COUNT: 16.9 % (ref 11–15)
HCT VFR BLD AUTO: 26.1 % (ref 39–53)
HGB BLD-MCNC: 8 G/DL (ref 13–17.5)
LYMPHOCYTES NFR BLD: 0.68 X10(3) UL (ref 1–4)
LYMPHOCYTES NFR BLD: 6 %
MCH RBC QN AUTO: 26.1 PG (ref 26–34)
MCHC RBC AUTO-ENTMCNC: 30.7 G/DL (ref 31–37)
MCV RBC AUTO: 85.3 FL (ref 80–100)
MONOCYTES # BLD: 0.68 X10(3) UL (ref 0.1–1)
MONOCYTES NFR BLD: 6 %
MORPHOLOGY: NORMAL
NEUTROPHILS # BLD AUTO: 7.24 X10 (3) UL (ref 1.5–7.7)
NEUTROPHILS NFR BLD: 80 %
NEUTS BAND NFR BLD: 6 %
NEUTS HYPERSEG # BLD: 9.72 X10(3) UL (ref 1.5–7.7)
PLATELET # BLD AUTO: 863 10(3)UL (ref 150–450)
PLATELET MORPHOLOGY: NORMAL
RBC # BLD AUTO: 3.06 X10(6)UL (ref 4.3–5.7)
TOTAL CELLS COUNTED: 100
WBC # BLD AUTO: 11.3 X10(3) UL (ref 4–11)

## 2019-08-01 PROCEDURE — 99232 SBSQ HOSP IP/OBS MODERATE 35: CPT | Performed by: INTERNAL MEDICINE

## 2019-08-01 RX ORDER — PSEUDOEPHEDRINE HCL 30 MG
100 TABLET ORAL 2 TIMES DAILY
Qty: 60 CAPSULE | Refills: 0 | Status: SHIPPED | OUTPATIENT
Start: 2019-08-01 | End: 2019-08-31

## 2019-08-01 RX ORDER — 0.9 % SODIUM CHLORIDE 0.9 %
VIAL (ML) INJECTION
Status: DISCONTINUED
Start: 2019-08-01 | End: 2019-08-01

## 2019-08-01 RX ORDER — SODIUM CHLORIDE 0.9 % (FLUSH) 0.9 %
SYRINGE (ML) INJECTION
Qty: 30 SYRINGE | Refills: 0 | Status: SHIPPED | OUTPATIENT
Start: 2019-08-01 | End: 2019-11-06

## 2019-08-01 RX ORDER — BISACODYL 10 MG
10 SUPPOSITORY, RECTAL RECTAL
Qty: 20 SUPPOSITORY | Refills: 0 | Status: SHIPPED | OUTPATIENT
Start: 2019-08-01 | End: 2019-08-31

## 2019-08-01 RX ORDER — SODIUM CHLORIDE 0.9 % (FLUSH) 0.9 %
10 SYRINGE (ML) INJECTION AS NEEDED
Qty: 50 SYRINGE | Refills: 0 | Status: SHIPPED | OUTPATIENT
Start: 2019-08-01 | End: 2019-08-01

## 2019-08-01 RX ORDER — HYDROCODONE BITARTRATE AND ACETAMINOPHEN 10; 325 MG/1; MG/1
1 TABLET ORAL EVERY 4 HOURS PRN
Qty: 20 TABLET | Refills: 0 | Status: SHIPPED | OUTPATIENT
Start: 2019-08-01 | End: 2019-11-06

## 2019-08-01 NOTE — PROGRESS NOTES
[place] [facility] - [facility]    Progress Note    Jeremy Barbosa Patient Status:  Inpatient    1965 MRN [id number]   Location [facility] 4W/SW/SE Attending Juvenal Ham MD   Hosp Day # 10 PCP Nathaniel Joe MD     Assessment and Plan:        IId 98.2 °F (36.8 °C) (Oral)   Resp 18   Ht 6' (1.829 m)   Wt 192 lb (87.1 kg)   SpO2 94%   BMI 26.04 kg/m²   Gen:  NAD  Abd:  Soft, NT, ND, incision C/D/I, Franklin and IR drains with purulent output. Results:     Lab Results   Component Value Date    WBC 11.

## 2019-08-01 NOTE — PAYOR COMM NOTE
--------------  CONTINUED STAY REVIEW    Payor: Navi PATEL/ARSEN  Subscriber #:  [id number]  Authorization Number: [id number]    Admit date: 7/22/19  Admit time: [address]    Admitting Physician: Hollie Ceballos MD  Attending Physician:  Mejia Modi MD  Primary 15 23 --      Total Output 33 43 --               Net I/O        4213 630 --              Exam:   /73 (BP Location: Right arm)   Pulse 71   Temp 98.2 °F (36.8 °C) (Oral)   Resp 18   Ht 6' (1.829 m)   Wt 192 lb (87.1 kg)   SpO2 94%   BMI 26.04 kg/m² HYDROcodone-acetaminophen (NORCO)  MG per tab 2 tablet     Date Action Dose Route User    7/31/2019 1950 Given 2 tablet Oral Marina Trevino, RN      [facility]) 500 mg in sodium chloride 0.9% 100 mL MBP     Date Action Dose Route User

## 2019-08-01 NOTE — PLAN OF CARE
Problem: Patient Centered Care  Goal: Patient preferences are identified and integrated in the patient's plan of care  Description  Interventions:  - What would you like us to know as we care for you?  I have been in the hospital most of the summer  - Pro including physical limitations  - Instruct pt to call for assistance with activity based on assessment  - Modify environment to reduce risk of injury  - Provide assistive devices as appropriate  - Consider OT/PT consult to assist with strengthening/mobilit

## 2019-08-01 NOTE — PROGRESS NOTES
[facility] ID PROGRESS NOTE    Angus Stanley Patient Status:  Inpatient    1965 MRN [id number]   Location [facility] 4W/SW/SE Attending Purnima Gomez MD   Hosp Day # 10 PCP Antonella Abdi MD     Subjective:  Awake, afebrile. Feeling better.  New MYRA drai [facility] admission 6/1-6/7 with worsening abdominal pain, CT at that that time peripancreatic inflammation with fluid/gas c/f possible necrosis s/p IR drainage on 6/3 growing Amp-S E. faecalis, S.anginosus, Prevotella, sent on IV dapto and invanz for four

## 2019-08-01 NOTE — DIETARY NOTE
ADULT NUTRITION REASSESSMENT     Pt is at moderate nutrition risk. Pt does not meet malnutrition criteria.       RECOMMENDATIONS TO MD:  None at this time     NUTRITION DIAGNOSIS/PROBLEM:  Unintentional weight loss related to increased energy needs as evid Body mass index is 26.04 kg/m².   BMI CLASSIFICATION: 25-29.9 kg/m2 - overweight  IBW: 178 lbs        113% IBW  Usual Body Wt: 225 lbs       90% UBW  WEIGHT HISTORY:  Patient Weight(s) for the past 336 hrs:   Weight   08/01/19 1042 87.1 kg (192 lb)   07/23/

## 2019-08-01 NOTE — PROGRESS NOTES
[facility]  Progress Note    Annamaria Burner Patient Status:  Inpatient    1965 MRN [id number]   Location [facility] 4W/SW/SE Attending Kody Leavitt MD   Hosp Day # 10 PCP Hafsa Gonzalez MD       Subjective:   Denies pain or di

## 2019-08-01 NOTE — PROGRESS NOTES
[facility]     Progress Note        Pilar Kirkland Patient Status:  Inpatient    1965 MRN [id number]   Location [facility] 4W/SW/SE Attending Nhung Pettit MD   Hosp Day # 10 PCP Contreras Trujillo MD       Subjective:   Patient se (SUBLIMAZE) 0.05 MG/ML injection 50 mcg 50 mcg Intravenous Q5 Min PRN   Naloxone HCl (NARCAN) 0.4 MG/ML injection 80 mcg 80 mcg Intravenous PRN   Flumazenil (ROMAZICON) injection 0.2 mg 0.2 mg Intravenous PRN   Normal Saline Flush 0.9 % injection 10 mL 10 08/01/2019       Ct Drain Abscess Subcutaneous With Catheter (cpt=10030)    Result Date: 7/30/2019  CONCLUSION:  1.  CT-guided percutaneous drainage of pancreatic abscess    Dictated by (CST): Davina Thomas MD on 7/30/2019 at 16:14     Approved by (CST):

## 2019-08-02 NOTE — DISCHARGE SUMMARY
[place] [facility] - [facility]    Discharge Summary    [facility] Patient Status:  Inpatient    1965 MRN [id number]   Location [facility] 4W/SW/SE Attending No att. providers found   [address] Day # 10 PCP Guille Arzola MD     Date of Admission: pancreatic debridement on 7/22– patient was continued on IV daptomycin and meropenem. Drain output monitored. WBC and fever monitored. Patient seen and followed by ID service and surgery service.   Postoperative patient developed hydropneumothorax, had c 500 MG Solr  Commonly known as:  CUBICIN        enoxaparin sodium 100 MG/ML Soln  Commonly known as:  LOVENOX        Meropenem 500 MG Solr  Commonly known as:  MERREM              Where to Get Your Medications      Please  your prescriptions at the

## 2019-08-02 NOTE — PAYOR COMM NOTE
--------------  DISCHARGE REVIEW    Payor: Tamia PATEL/ARSEN  Subscriber #:  [id number]  Authorization Number: [id number]    Admit date: 7/22/19  Admit time:  1622  Discharge Date: 8/1/2019  5:22 PM     Admitting Physician: Vini Merino MD  Attending Physic

## 2019-08-13 ENCOUNTER — OFFICE VISIT (OUTPATIENT)
Dept: SURGERY | Facility: CLINIC | Age: 54
End: 2019-08-13
Payer: COMMERCIAL

## 2019-08-13 ENCOUNTER — LAB ENCOUNTER (OUTPATIENT)
Dept: LAB | Facility: HOSPITAL | Age: 54
End: 2019-08-13
Attending: SURGERY
Payer: COMMERCIAL

## 2019-08-13 VITALS — BODY MASS INDEX: 26 KG/M2 | WEIGHT: 192 LBS

## 2019-08-13 DIAGNOSIS — K86.89 PANCREATIC NECROSIS: Primary | ICD-10-CM

## 2019-08-13 DIAGNOSIS — K86.89 PANCREATIC NECROSIS: ICD-10-CM

## 2019-08-13 LAB
BASOPHILS # BLD AUTO: 0.1 X10(3) UL (ref 0–0.2)
BASOPHILS NFR BLD AUTO: 0.7 %
DEPRECATED RDW RBC AUTO: 52.5 FL (ref 35.1–46.3)
EOSINOPHIL # BLD AUTO: 0.55 X10(3) UL (ref 0–0.7)
EOSINOPHIL NFR BLD AUTO: 4 %
ERYTHROCYTE [DISTWIDTH] IN BLOOD BY AUTOMATED COUNT: 17 % (ref 11–15)
HCT VFR BLD AUTO: 35.1 % (ref 39–53)
HGB BLD-MCNC: 10.6 G/DL (ref 13–17.5)
IMM GRANULOCYTES # BLD AUTO: 0.1 X10(3) UL (ref 0–1)
IMM GRANULOCYTES NFR BLD: 0.7 %
LYMPHOCYTES # BLD AUTO: 3.73 X10(3) UL (ref 1–4)
LYMPHOCYTES NFR BLD AUTO: 26.8 %
MCH RBC QN AUTO: 25.6 PG (ref 26–34)
MCHC RBC AUTO-ENTMCNC: 30.2 G/DL (ref 31–37)
MCV RBC AUTO: 84.8 FL (ref 80–100)
MONOCYTES # BLD AUTO: 0.96 X10(3) UL (ref 0.1–1)
MONOCYTES NFR BLD AUTO: 6.9 %
NEUTROPHILS # BLD AUTO: 8.46 X10 (3) UL (ref 1.5–7.7)
NEUTROPHILS # BLD AUTO: 8.46 X10(3) UL (ref 1.5–7.7)
NEUTROPHILS NFR BLD AUTO: 60.9 %
PLATELET # BLD AUTO: 578 10(3)UL (ref 150–450)
RBC # BLD AUTO: 4.14 X10(6)UL (ref 4.3–5.7)
WBC # BLD AUTO: 13.9 X10(3) UL (ref 4–11)

## 2019-08-13 PROCEDURE — 99024 POSTOP FOLLOW-UP VISIT: CPT | Performed by: SURGERY

## 2019-08-13 PROCEDURE — 85025 COMPLETE CBC W/AUTO DIFF WBC: CPT

## 2019-08-13 PROCEDURE — 36415 COLL VENOUS BLD VENIPUNCTURE: CPT

## 2019-08-13 RX ORDER — FLUTICASONE PROPIONATE 50 MCG
SPRAY, SUSPENSION (ML) NASAL
COMMUNITY
Start: 2015-06-10 | End: 2019-11-06

## 2019-08-13 RX ORDER — EMTRICITABINE AND TENOFOVIR DISOPROXIL FUMARATE 200; 300 MG/1; MG/1
TABLET, FILM COATED ORAL
COMMUNITY
Start: 2015-11-03 | End: 2019-11-06

## 2019-08-13 RX ORDER — HYDROCODONE BITARTRATE AND ACETAMINOPHEN 5; 325 MG/1; MG/1
1 TABLET ORAL EVERY 6 HOURS PRN
Qty: 30 TABLET | Refills: 0 | Status: SHIPPED | OUTPATIENT
Start: 2019-08-13 | End: 2019-08-23

## 2019-08-13 RX ORDER — FLUTICASONE PROPIONATE AND SALMETEROL 250; 50 UG/1; UG/1
POWDER RESPIRATORY (INHALATION)
COMMUNITY
Start: 2015-07-20 | End: 2019-11-06

## 2019-08-13 RX ORDER — FLUTICASONE PROPIONATE AND SALMETEROL 500; 50 UG/1; UG/1
POWDER RESPIRATORY (INHALATION)
COMMUNITY
Start: 2015-06-10 | End: 2019-11-06

## 2019-08-13 RX ORDER — IBUPROFEN 600 MG/1
TABLET ORAL
Refills: 0 | COMMUNITY
Start: 2019-05-21 | End: 2019-11-06

## 2019-08-13 NOTE — PROGRESS NOTES
Patient presents with:  Post-Op: Diagnostic laparoscopy 7/22/2019. Patient states he is feeling better every day. Appetite is very good, bowels are normal, no nausea/vomiting. States his drain on his left flank was pulled out a little, the stitch broke.

## 2019-08-19 ENCOUNTER — TELEPHONE (OUTPATIENT)
Dept: SURGERY | Facility: CLINIC | Age: 54
End: 2019-08-19

## 2019-08-19 NOTE — TELEPHONE ENCOUNTER
bessie from [facility] called. Pt is scheduled 8-22-19 for ct scan cpt 26618. Need prior auth.   Call [employer] at [phone]

## 2019-08-21 NOTE — TELEPHONE ENCOUNTER
Auth # [id number] approved for CPT 87852 and DOS 8/21/2019 to 10/5/2019. Inbasket message to Lance to update.

## 2019-08-22 ENCOUNTER — HOSPITAL ENCOUNTER (OUTPATIENT)
Dept: CT IMAGING | Facility: HOSPITAL | Age: 54
Discharge: HOME | End: 2019-08-22
Attending: SURGERY
Payer: COMMERCIAL

## 2019-08-22 DIAGNOSIS — K86.89 PANCREATIC NECROSIS: ICD-10-CM

## 2019-08-22 PROCEDURE — 74177 CT ABD & PELVIS W/CONTRAST: CPT | Performed by: SURGERY

## 2019-08-27 ENCOUNTER — OFFICE VISIT (OUTPATIENT)
Dept: SURGERY | Facility: CLINIC | Age: 54
End: 2019-08-27
Payer: COMMERCIAL

## 2019-08-27 DIAGNOSIS — T81.49XA ABSCESS AFTER PROCEDURE: Primary | ICD-10-CM

## 2019-08-27 DIAGNOSIS — Z09 POSTOPERATIVE EXAMINATION: ICD-10-CM

## 2019-08-27 PROCEDURE — 99024 POSTOP FOLLOW-UP VISIT: CPT | Performed by: SURGERY

## 2019-08-27 NOTE — PROGRESS NOTES
Patient presents with:  Post-Op: Pt here for 1st post op s/p Video assisted retroperitoneal debridement of the pancreas on 7/22/19. Pt  states has two drains in place. Pt  c/o constant pain from  drain areas. Pt had CT scan on 8/22/19.     VSS  Gen:  NAD

## 2019-08-29 RX ORDER — ACETAMINOPHEN 325 MG/1
650 TABLET ORAL EVERY 4 HOURS PRN
Status: DISCONTINUED | OUTPATIENT
Start: 2019-08-29 | End: 2019-09-01

## 2019-08-29 RX ORDER — MIDAZOLAM HYDROCHLORIDE 1 MG/ML
1 INJECTION INTRAMUSCULAR; INTRAVENOUS EVERY 5 MIN PRN
Status: DISCONTINUED | OUTPATIENT
Start: 2019-08-29 | End: 2019-09-01

## 2019-08-29 RX ORDER — SODIUM CHLORIDE 9 MG/ML
INJECTION, SOLUTION INTRAVENOUS CONTINUOUS
Status: DISCONTINUED | OUTPATIENT
Start: 2019-08-29 | End: 2019-09-01

## 2019-08-29 RX ORDER — MORPHINE SULFATE 2 MG/ML
2 INJECTION, SOLUTION INTRAMUSCULAR; INTRAVENOUS EVERY 2 HOUR PRN
Status: DISCONTINUED | OUTPATIENT
Start: 2019-08-29 | End: 2019-09-01

## 2019-08-29 RX ORDER — MORPHINE SULFATE 4 MG/ML
4 INJECTION, SOLUTION INTRAMUSCULAR; INTRAVENOUS EVERY 2 HOUR PRN
Status: DISCONTINUED | OUTPATIENT
Start: 2019-08-29 | End: 2019-09-01

## 2019-08-29 RX ORDER — FLUMAZENIL 0.1 MG/ML
0.2 INJECTION, SOLUTION INTRAVENOUS AS NEEDED
Status: DISCONTINUED | OUTPATIENT
Start: 2019-08-29 | End: 2019-09-01

## 2019-08-29 RX ORDER — HYDROCODONE BITARTRATE AND ACETAMINOPHEN 10; 325 MG/1; MG/1
2 TABLET ORAL EVERY 4 HOURS PRN
Status: DISCONTINUED | OUTPATIENT
Start: 2019-08-29 | End: 2019-09-01

## 2019-08-29 RX ORDER — MORPHINE SULFATE 2 MG/ML
1 INJECTION, SOLUTION INTRAMUSCULAR; INTRAVENOUS EVERY 2 HOUR PRN
Status: DISCONTINUED | OUTPATIENT
Start: 2019-08-29 | End: 2019-09-01

## 2019-08-29 RX ORDER — HYDROCODONE BITARTRATE AND ACETAMINOPHEN 10; 325 MG/1; MG/1
1 TABLET ORAL EVERY 4 HOURS PRN
Status: DISCONTINUED | OUTPATIENT
Start: 2019-08-29 | End: 2019-09-01

## 2019-08-29 RX ORDER — NALOXONE HYDROCHLORIDE 0.4 MG/ML
80 INJECTION, SOLUTION INTRAMUSCULAR; INTRAVENOUS; SUBCUTANEOUS AS NEEDED
Status: DISCONTINUED | OUTPATIENT
Start: 2019-08-29 | End: 2019-09-01

## 2019-08-30 ENCOUNTER — HOSPITAL ENCOUNTER (OUTPATIENT)
Dept: CT IMAGING | Facility: HOSPITAL | Age: 54
Discharge: HOME | End: 2019-08-30
Attending: SURGERY
Payer: COMMERCIAL

## 2019-08-30 VITALS
SYSTOLIC BLOOD PRESSURE: 101 MMHG | HEART RATE: 74 BPM | DIASTOLIC BLOOD PRESSURE: 84 MMHG | RESPIRATION RATE: 21 BRPM | OXYGEN SATURATION: 96 %

## 2019-08-30 DIAGNOSIS — T81.43XA INTRA-ABDOMINAL ABSCESS POST-PROCEDURE: ICD-10-CM

## 2019-08-30 PROCEDURE — 99152 MOD SED SAME PHYS/QHP 5/>YRS: CPT | Performed by: SURGERY

## 2019-08-30 PROCEDURE — 75984 XRAY CONTROL CATHETER CHANGE: CPT | Performed by: SURGERY

## 2019-08-30 PROCEDURE — 87070 CULTURE OTHR SPECIMN AEROBIC: CPT | Performed by: SURGERY

## 2019-08-30 PROCEDURE — 49423 EXCHANGE DRAINAGE CATHETER: CPT | Performed by: SURGERY

## 2019-08-30 PROCEDURE — 87075 CULTR BACTERIA EXCEPT BLOOD: CPT | Performed by: SURGERY

## 2019-08-30 PROCEDURE — 87077 CULTURE AEROBIC IDENTIFY: CPT | Performed by: SURGERY

## 2019-08-30 PROCEDURE — 87076 CULTURE ANAEROBE IDENT EACH: CPT | Performed by: SURGERY

## 2019-08-30 PROCEDURE — 87205 SMEAR GRAM STAIN: CPT | Performed by: SURGERY

## 2019-08-30 PROCEDURE — 87186 SC STD MICRODIL/AGAR DIL: CPT | Performed by: SURGERY

## 2019-08-30 RX ORDER — MORPHINE SULFATE 4 MG/ML
INJECTION, SOLUTION INTRAMUSCULAR; INTRAVENOUS
Status: DISPENSED
Start: 2019-08-30 | End: 2019-08-30

## 2019-08-30 RX ADMIN — MIDAZOLAM HYDROCHLORIDE 1 MG: 1 INJECTION INTRAMUSCULAR; INTRAVENOUS at 12:19:00

## 2019-08-30 RX ADMIN — MIDAZOLAM HYDROCHLORIDE 1 MG: 1 INJECTION INTRAMUSCULAR; INTRAVENOUS at 12:06:00

## 2019-08-30 RX ADMIN — MIDAZOLAM HYDROCHLORIDE 1 MG: 1 INJECTION INTRAMUSCULAR; INTRAVENOUS at 12:03:00

## 2019-08-30 RX ADMIN — MIDAZOLAM HYDROCHLORIDE 1 MG: 1 INJECTION INTRAMUSCULAR; INTRAVENOUS at 12:14:00

## 2019-08-30 RX ADMIN — MIDAZOLAM HYDROCHLORIDE 2 MG: 1 INJECTION INTRAMUSCULAR; INTRAVENOUS at 11:59:00

## 2019-08-30 NOTE — PROGRESS NOTES
PROCEDURE COMPLETE. LEFT FLANK DRAIN REMOVED. UPPER ABDOMINAL DRAIN EXCHANGED. 8FR MYRA DRAIN PLACED. 10ML REMOVED SENT AS SPECIMEN TO LAB. DRAIN DRESSING AND BIOPATCH  APPLIED. PT TO ROOM 3 CATH LAB HOLDING.  REPORT TO April VANNESSA

## 2019-08-30 NOTE — IVS NOTE
Pt is able to sit up and ambulate without difficulty. Pt's vital signs are stable. Procedural site remains dry and intact No signs and symptoms of bleeding noted. Instructions provided and pt/family verbalized understanding.  Dr. Gaurang Hinson spoke with pt Justin Navarro

## 2019-09-04 ENCOUNTER — TELEPHONE (OUTPATIENT)
Dept: SURGERY | Facility: CLINIC | Age: 54
End: 2019-09-04

## 2019-09-04 NOTE — TELEPHONE ENCOUNTER
I spoke to pt, states drain was changed by IR last Thursday. Pt's sts it is not functioning/ not draining. He has been unable to reach IR. I transferred caller to [facility]. in IR.

## 2019-09-10 ENCOUNTER — HOSPITAL ENCOUNTER (OUTPATIENT)
Dept: CT IMAGING | Facility: HOSPITAL | Age: 54
Discharge: HOME | End: 2019-09-10
Attending: CLINICAL NURSE SPECIALIST
Payer: COMMERCIAL

## 2019-09-10 DIAGNOSIS — K65.1 ABSCESS OF ABDOMINAL CAVITY (HCC): ICD-10-CM

## 2019-09-10 LAB — CREAT BLD-MCNC: 1 MG/DL (ref 0.7–1.3)

## 2019-09-10 PROCEDURE — 82565 ASSAY OF CREATININE: CPT

## 2019-09-10 PROCEDURE — 74177 CT ABD & PELVIS W/CONTRAST: CPT | Performed by: CLINICAL NURSE SPECIALIST

## 2019-09-11 ENCOUNTER — OFFICE VISIT (OUTPATIENT)
Dept: INTERVENTIONAL RADIOLOGY/VASCULAR | Facility: HOSPITAL | Age: 54
End: 2019-09-11
Attending: RADIOLOGY
Payer: COMMERCIAL

## 2019-09-11 DIAGNOSIS — K85.91 NECROTIZING PANCREATITIS: Primary | ICD-10-CM

## 2019-09-11 NOTE — PROGRESS NOTES
[facility]  Progress Note    Liozulema Lara Patient Status:  Outpatient    1965 MRN [id number]   Location [place] [address] Attending Jose M Smith MD   Hosp Day # 0 PCP Gretchen Cooper MD       Subjective:   Walter Zaidi

## 2019-10-07 ENCOUNTER — LAB ENCOUNTER (OUTPATIENT)
Dept: LAB | Age: 54
End: 2019-10-07
Attending: INTERNAL MEDICINE
Payer: COMMERCIAL

## 2019-10-07 DIAGNOSIS — K85.91 ACUTE NECROTIZING PANCREATITIS: Primary | ICD-10-CM

## 2019-10-07 PROCEDURE — 36415 COLL VENOUS BLD VENIPUNCTURE: CPT

## 2019-10-07 PROCEDURE — 85025 COMPLETE CBC W/AUTO DIFF WBC: CPT

## 2019-10-07 PROCEDURE — 80053 COMPREHEN METABOLIC PANEL: CPT

## 2019-10-29 ENCOUNTER — OFFICE VISIT (OUTPATIENT)
Dept: SURGERY | Facility: CLINIC | Age: 54
End: 2019-10-29
Payer: COMMERCIAL

## 2019-10-29 VITALS — WEIGHT: 192 LBS | BODY MASS INDEX: 26.01 KG/M2 | HEIGHT: 72 IN

## 2019-10-29 DIAGNOSIS — R53.83 MALAISE AND FATIGUE: Primary | ICD-10-CM

## 2019-10-29 DIAGNOSIS — R53.81 MALAISE AND FATIGUE: Primary | ICD-10-CM

## 2019-10-29 PROCEDURE — 99212 OFFICE O/P EST SF 10 MIN: CPT | Performed by: SURGERY

## 2019-10-29 NOTE — PROGRESS NOTES
Patient presents with: Other: Pt here today for chronic pain 2-3/10 that began around 3 weeks ago. He states that the pain is all over his body, including his arms, back and knees. Tolerating a general diet and gaining weight.   No abdominal pain for s

## 2019-11-06 ENCOUNTER — OFFICE VISIT (OUTPATIENT)
Dept: INTEGRATIVE MEDICINE | Facility: CLINIC | Age: 54
End: 2019-11-06
Payer: COMMERCIAL

## 2019-11-06 ENCOUNTER — LAB ENCOUNTER (OUTPATIENT)
Dept: LAB | Facility: REFERENCE LAB | Age: 54
End: 2019-11-06
Attending: FAMILY MEDICINE
Payer: COMMERCIAL

## 2019-11-06 VITALS
OXYGEN SATURATION: 99 % | DIASTOLIC BLOOD PRESSURE: 80 MMHG | HEART RATE: 76 BPM | SYSTOLIC BLOOD PRESSURE: 114 MMHG | WEIGHT: 216.38 LBS | BODY MASS INDEX: 29.31 KG/M2 | HEIGHT: 72 IN

## 2019-11-06 DIAGNOSIS — R53.82 CHRONIC FATIGUE: ICD-10-CM

## 2019-11-06 DIAGNOSIS — M25.562 ARTHRALGIA OF BOTH KNEES: ICD-10-CM

## 2019-11-06 DIAGNOSIS — M25.561 ARTHRALGIA OF BOTH KNEES: ICD-10-CM

## 2019-11-06 DIAGNOSIS — M79.10 MYALGIA: ICD-10-CM

## 2019-11-06 DIAGNOSIS — G47.33 OSA (OBSTRUCTIVE SLEEP APNEA): Primary | ICD-10-CM

## 2019-11-06 DIAGNOSIS — D63.8 ANEMIA IN OTHER CHRONIC DISEASES CLASSIFIED ELSEWHERE: ICD-10-CM

## 2019-11-06 DIAGNOSIS — K85.91 NECROTIZING PANCREATITIS: ICD-10-CM

## 2019-11-06 DIAGNOSIS — I82.90 THROMBOSIS: ICD-10-CM

## 2019-11-06 DIAGNOSIS — K85.22 ALCOHOL-INDUCED ACUTE PANCREATITIS WITH INFECTED NECROSIS: ICD-10-CM

## 2019-11-06 DIAGNOSIS — M79.10 MYALGIA: Primary | ICD-10-CM

## 2019-11-06 PROBLEM — D72.829 LEUKOCYTOSIS: Status: RESOLVED | Noted: 2019-06-01 | Resolved: 2019-11-06

## 2019-11-06 PROBLEM — J18.9 HCAP (HEALTHCARE-ASSOCIATED PNEUMONIA): Status: RESOLVED | Noted: 2019-06-14 | Resolved: 2019-11-06

## 2019-11-06 PROBLEM — R50.9 ACUTE FEBRILE ILLNESS: Status: RESOLVED | Noted: 2019-06-14 | Resolved: 2019-11-06

## 2019-11-06 PROBLEM — R78.81 BACTEREMIA: Status: RESOLVED | Noted: 2019-06-01 | Resolved: 2019-11-06

## 2019-11-06 PROBLEM — A41.9 SEPSIS DUE TO UNDETERMINED ORGANISM (HCC): Status: RESOLVED | Noted: 2019-06-14 | Resolved: 2019-11-06

## 2019-11-06 PROCEDURE — 86140 C-REACTIVE PROTEIN: CPT | Performed by: FAMILY MEDICINE

## 2019-11-06 PROCEDURE — 85652 RBC SED RATE AUTOMATED: CPT | Performed by: FAMILY MEDICINE

## 2019-11-06 PROCEDURE — 84550 ASSAY OF BLOOD/URIC ACID: CPT | Performed by: FAMILY MEDICINE

## 2019-11-06 PROCEDURE — 86200 CCP ANTIBODY: CPT | Performed by: FAMILY MEDICINE

## 2019-11-06 PROCEDURE — 82728 ASSAY OF FERRITIN: CPT | Performed by: FAMILY MEDICINE

## 2019-11-06 PROCEDURE — 85025 COMPLETE CBC W/AUTO DIFF WBC: CPT | Performed by: FAMILY MEDICINE

## 2019-11-06 PROCEDURE — 84443 ASSAY THYROID STIM HORMONE: CPT | Performed by: FAMILY MEDICINE

## 2019-11-06 PROCEDURE — 82550 ASSAY OF CK (CPK): CPT | Performed by: FAMILY MEDICINE

## 2019-11-06 PROCEDURE — 86376 MICROSOMAL ANTIBODY EACH: CPT | Performed by: FAMILY MEDICINE

## 2019-11-06 PROCEDURE — 86800 THYROGLOBULIN ANTIBODY: CPT | Performed by: FAMILY MEDICINE

## 2019-11-06 PROCEDURE — 86038 ANTINUCLEAR ANTIBODIES: CPT | Performed by: FAMILY MEDICINE

## 2019-11-06 PROCEDURE — 36415 COLL VENOUS BLD VENIPUNCTURE: CPT | Performed by: FAMILY MEDICINE

## 2019-11-06 PROCEDURE — 86431 RHEUMATOID FACTOR QUANT: CPT | Performed by: FAMILY MEDICINE

## 2019-11-06 PROCEDURE — 99205 OFFICE O/P NEW HI 60 MIN: CPT | Performed by: FAMILY MEDICINE

## 2019-11-06 PROCEDURE — 86628 CANDIDA ANTIBODY: CPT | Performed by: FAMILY MEDICINE

## 2019-11-06 PROCEDURE — 84481 FREE ASSAY (FT-3): CPT | Performed by: FAMILY MEDICINE

## 2019-11-06 PROCEDURE — 84439 ASSAY OF FREE THYROXINE: CPT | Performed by: FAMILY MEDICINE

## 2019-11-06 RX ORDER — DICLOFENAC SODIUM 75 MG/1
75 TABLET, DELAYED RELEASE ORAL 2 TIMES DAILY PRN
Qty: 60 TABLET | Refills: 0 | Status: SHIPPED | OUTPATIENT
Start: 2019-11-06 | End: 2019-11-06

## 2019-11-06 RX ORDER — DICLOFENAC SODIUM 75 MG/1
TABLET, DELAYED RELEASE ORAL
Qty: 180 TABLET | Refills: 0 | Status: SHIPPED | OUTPATIENT
Start: 2019-11-06

## 2019-11-06 NOTE — PATIENT INSTRUCTIONS
I have complete jia in the body's ability to heal and transform. The products and items listed below (the “Products”)  and their claims have not been evaluated by the Food and Drug Administration.  Dietary products are not intended to treat, prevent, m Amino Acids  Asparagine  Glutamine  Serine    Antioxidants  Coenzyme Q10  Cysteine  Glutathione  Selenium    Metabolites  Choline  Inositol  Carnitine  MMA (Methyl Maloneic Acid)    Electrolytes  Sodium  Potassium    Intracellular (RBC)  RBC Folate  RBC Ma

## 2019-11-06 NOTE — PROGRESS NOTES
Andrew Slade is a 62year old male. Patient presents with:  Establish Care  Physical: c/o tiredness, sleepiness, body aches and knee joint pains      HPI:     Having chronic joint pains. Also constant body aches and weakness. Also chronic fatigue.    Has Past Medical History:   Diagnosis Date   • Asthma    • Disorder of liver    • Pancreatitis    • Sepsis ([address]    • Sleep apnea     no CPAP use   • Splenic vein thrombosis    • Visual impairment     wears eyeglasses       CURRENT MEDICATIONS:     Current Out Physically abused: Not on file        Forced sexual activity: Not on file    Other Topics      Concerns:        Not on file    Social History Narrative      Mary [place]ons in [address]:     Past Surgical History:   Procedure - MYNOR,DIRECT,REFLEX TITER + SPECIFIC ANTIBODIES; Future  - FERRITIN; Future  - CK CREATINE KINASE (NOT CREATININE); Future    3. Myalgia  - CANDIDA IGG/A/M AB PANEL; Future  - ASSAY, THYROID STIM HORMONE; Future  - FREE T4 (FREE THYROXINE);  Future  - THYRO Given further recommendations as below    Orders Placed This Visit:  Orders Placed This Encounter      Candida IgG/A/M Ab Panel [E]      TSH [E]      Thyroxine, Free [E]      Thyroid Peroxidase (TPO) AB [E]      Thyroid Antithyroglobulin AB [E]      Free T Where to Find: [employer]/qots9ollutike  Directions: 5 drops under the tongue twice daily  Why: Ashlee Sidle is a blend of full spectrum hemp oil and other essential nutrients    [employer] [facility] Micronutrient Information  The only test that provides a com PLEASE NOTE: As this is a lab test done by an outside company, these results do not pull into your [employer] lab results online.  A copy of the results typically will be given to you when you follow up to discuss the results in the office unless otherwise spe

## 2019-11-19 ENCOUNTER — TELEPHONE (OUTPATIENT)
Dept: INTEGRATIVE MEDICINE | Facility: CLINIC | Age: 54
End: 2019-11-19

## 2019-11-19 NOTE — TELEPHONE ENCOUNTER
----- Message from Dereje Michele DO sent at 11/13/2019  8:39 AM CST -----  Regarding: Prednisone taper?

## 2019-11-25 NOTE — TELEPHONE ENCOUNTER
Please contact patient to get an update on how he is doing. I may want to try a prednisone taper if he is not feeling better and/or refer to rheumatology for further management.

## 2019-11-30 NOTE — PROGRESS NOTES
[place] [facility]    Progress Note    Olga Thapa Patient Status:  Inpatient    1965 MRN [id number]   Location [facility] 4W/SW/SE Attending Agustina Anderson,  [facility] Day # 3 PCP Jersey Middleton MD     Assessment and Plan:      53 y/o man Intake 754 1308 --       Output    Urine  700  950  --    Urine 700 950 --    Void Urine Occurrence 1 x 1 x --    Drains  65  48  --    Output (mL) (Closed/Suction Drain Left Back 24 Fr.) 65 48 --    Other  --  60  --    Other -- 60 --    Chest Tube  --  1 size.  3. In the left posterolateral flank soft tissues, adjacent to the catheter insertion site, there is a small fluid collection. Correlation with physical exam findings is recommended to evaluate for potential abscess formation.   4. Compressive atelect (3) walks occasionally

## 2019-12-06 ENCOUNTER — OFFICE VISIT (OUTPATIENT)
Dept: INTEGRATIVE MEDICINE | Facility: CLINIC | Age: 54
End: 2019-12-06
Payer: COMMERCIAL

## 2019-12-06 VITALS
SYSTOLIC BLOOD PRESSURE: 130 MMHG | WEIGHT: 221 LBS | DIASTOLIC BLOOD PRESSURE: 80 MMHG | HEIGHT: 72 IN | BODY MASS INDEX: 29.93 KG/M2

## 2019-12-06 DIAGNOSIS — R14.3 FLATULENCE: ICD-10-CM

## 2019-12-06 DIAGNOSIS — R14.0 BLOATING: Primary | ICD-10-CM

## 2019-12-06 PROCEDURE — 99214 OFFICE O/P EST MOD 30 MIN: CPT | Performed by: PHYSICIAN ASSISTANT

## 2019-12-06 NOTE — PROGRESS NOTES
Mamadou Reardon is a 62year old male. Patient presents with:  Bloating: since post surg. HPI:   Diclofenac is working well. If he misses a dose he has a lot of pain. Has a lot of bloating and gas since his surgery. Getting worse.  Also having heartbu • DICLOFENAC SODIUM 75 MG Oral Tab EC TAKE 1 TABLET(75 MG) BY MOUTH TWICE DAILY AS NEEDED FOR PAIN 180 tablet 0   • Albuterol Sulfate  (90 Base) MCG/ACT Inhalation Aero Soln Inhale 2 puffs into the lungs every 4 (four) hours as needed for Wheezing. IR FLUOROSCOPIC GUIDED RETROPERITONEAL ABSCESS DRAIN    • Other      Rhinoplasty , tummy tuck   • Other surgical history  07/22/2019    Video assisted retroperitoneal debridement of the pancreas       PHYSICAL EXAM:      12/06/19  1255   BP: 130/80   Leandra Michele Where to Find: [email or web address]    APPLE CIDER VINEGAR TONIC  Take 1 tsp of Apple Cider Vinegar once-twice daily. Apple Cider Vinegar Alkalizes the body, making for a better pH balance throughout the system. It can also improve heartburn symptoms.  I recommend bu Where: [employer] or [employer]    Restore  A liquid supplement for gut health. This is a carbon, soil-based product that helps to rebuild the tight junctions, or important connections between cells, in the intestines.  These tight junctions get compro

## 2019-12-06 NOTE — PATIENT INSTRUCTIONS
Biogest Digestive Enzyme   Or some other brand    Directions: Take 1-2 capsules after meals to help with digestion  Where to Find: [email or web address]    APPLE CIDER VINEGAR TONIC  Take 1 tsp of Apple Cider Vinegar once-twice daily.  Apple Cider Vinegar Alkalize Why: to improve microflora balance in the system, to kill of yeast and bacteria that are not of benefit to the body. Directions: 1 tablet twice daily       Where: [employer] or [employer]    Restore  A liquid supplement for gut health.  This is a car

## 2020-03-11 ENCOUNTER — OFFICE VISIT (OUTPATIENT)
Dept: INTEGRATIVE MEDICINE | Facility: CLINIC | Age: 55
End: 2020-03-11
Payer: COMMERCIAL

## 2020-03-11 VITALS
HEART RATE: 68 BPM | WEIGHT: 230.38 LBS | BODY MASS INDEX: 31.2 KG/M2 | DIASTOLIC BLOOD PRESSURE: 82 MMHG | SYSTOLIC BLOOD PRESSURE: 116 MMHG | HEIGHT: 72 IN | OXYGEN SATURATION: 98 %

## 2020-03-11 DIAGNOSIS — G47.33 OSA (OBSTRUCTIVE SLEEP APNEA): ICD-10-CM

## 2020-03-11 DIAGNOSIS — R35.1 NOCTURIA: ICD-10-CM

## 2020-03-11 DIAGNOSIS — I82.90 THROMBOSIS: ICD-10-CM

## 2020-03-11 DIAGNOSIS — R06.2 WHEEZING: ICD-10-CM

## 2020-03-11 DIAGNOSIS — M25.50 POLYARTHRALGIA: Primary | ICD-10-CM

## 2020-03-11 DIAGNOSIS — K86.89 PERIPANCREATIC FLUID COLLECTION: Primary | ICD-10-CM

## 2020-03-11 DIAGNOSIS — K21.9 GASTROESOPHAGEAL REFLUX DISEASE, ESOPHAGITIS PRESENCE NOT SPECIFIED: ICD-10-CM

## 2020-03-11 DIAGNOSIS — R53.82 CHRONIC FATIGUE: ICD-10-CM

## 2020-03-11 DIAGNOSIS — G47.10 HYPERSOMNOLENCE: ICD-10-CM

## 2020-03-11 PROCEDURE — 99214 OFFICE O/P EST MOD 30 MIN: CPT | Performed by: FAMILY MEDICINE

## 2020-03-11 RX ORDER — TAMSULOSIN HYDROCHLORIDE 0.4 MG/1
0.4 CAPSULE ORAL DAILY
Qty: 60 CAPSULE | Refills: 0 | Status: SHIPPED | OUTPATIENT
Start: 2020-03-11 | End: 2020-03-11

## 2020-03-11 RX ORDER — TAMSULOSIN HYDROCHLORIDE 0.4 MG/1
CAPSULE ORAL
Qty: 90 CAPSULE | Refills: 0 | Status: SHIPPED | OUTPATIENT
Start: 2020-03-11

## 2020-03-11 NOTE — PATIENT INSTRUCTIONS
I have complete jia in the body's ability to heal and transform. The products and items listed below (the “Products”)  and their claims have not been evaluated by the Food and Drug Administration.  Dietary products are not intended to treat, prevent, m

## 2020-03-11 NOTE — PROGRESS NOTES
Talon Guzman is a 62year old male. Patient presents with:  Side Effect: Diclofenac causing headache  Fatigue  Breathing Problem      HPI:     Taking the diclofenac which is helping his pain, however it is contributing to a HA. Gaining weight.  Eating le needs:         Medical: Not on file        Non-medical: Not on file    Tobacco Use      Smoking status: Never Smoker      Smokeless tobacco: Never Used    Substance and Sexual Activity      Alcohol use: Not Currently        Comment: drinks 3-4  daily before deformity or edema. Neurological: He is alert and oriented to person, place, and time. Skin: Skin is warm and dry. Psychiatric: Affect normal.       ASSESSMENT AND PLAN:       1. Polyarthralgia  - RHEUMATOLOGY - INTERNAL    2.  Thrombosis  - OP REFERR any supplement, dietary, or exercise program, especially if you are pregnant or have any pre-existing injuries or medical conditions.  The patient agrees that the [facility] and its affiliates and its [facility] are

## 2020-03-24 ENCOUNTER — TELEPHONE (OUTPATIENT)
Dept: INTEGRATIVE MEDICINE | Facility: CLINIC | Age: 55
End: 2020-03-24

## 2020-03-24 NOTE — TELEPHONE ENCOUNTER
Patient notified through [facility] that sleep study has been authorized and that his insurance did not need one, scheduling number given

## 2020-05-06 ENCOUNTER — TELEMEDICINE (OUTPATIENT)
Dept: INTEGRATIVE MEDICINE | Facility: CLINIC | Age: 55
End: 2020-05-06
Payer: COMMERCIAL

## 2020-05-06 DIAGNOSIS — I82.90 THROMBOSIS: ICD-10-CM

## 2020-05-06 DIAGNOSIS — J45.909 UNCOMPLICATED ASTHMA, UNSPECIFIED ASTHMA SEVERITY, UNSPECIFIED WHETHER PERSISTENT: ICD-10-CM

## 2020-05-06 DIAGNOSIS — D63.8 ANEMIA IN OTHER CHRONIC DISEASES CLASSIFIED ELSEWHERE: ICD-10-CM

## 2020-05-06 DIAGNOSIS — M79.10 MYALGIA: ICD-10-CM

## 2020-05-06 DIAGNOSIS — G47.10 HYPERSOMNOLENCE: ICD-10-CM

## 2020-05-06 DIAGNOSIS — R53.82 CHRONIC FATIGUE: ICD-10-CM

## 2020-05-06 DIAGNOSIS — M25.50 POLYARTHRALGIA: Primary | ICD-10-CM

## 2020-05-06 PROCEDURE — 99214 OFFICE O/P EST MOD 30 MIN: CPT | Performed by: FAMILY MEDICINE

## 2020-05-06 RX ORDER — MELOXICAM 15 MG/1
15 TABLET ORAL DAILY
Qty: 60 TABLET | Refills: 0 | Status: SHIPPED | OUTPATIENT
Start: 2020-05-06 | End: 2020-05-07

## 2020-05-06 RX ORDER — FLUTICASONE PROPIONATE 50 MCG
2 SPRAY, SUSPENSION (ML) NASAL DAILY
Qty: 1 BOTTLE | Refills: 1 | Status: SHIPPED | OUTPATIENT
Start: 2020-05-06 | End: 2020-05-07

## 2020-05-06 RX ORDER — MONTELUKAST SODIUM 10 MG/1
10 TABLET ORAL NIGHTLY
Qty: 90 TABLET | Refills: 0 | Status: SHIPPED | OUTPATIENT
Start: 2020-05-06 | End: 2020-08-03

## 2020-05-06 NOTE — PROGRESS NOTES
Nandini Lara is a 62year old male. Patient presents with: Follow - Up      HPI:     Continues to have the body pains. Diclofenac was giving him HAs so he is taking ibuprofen which isn't strong. Temp is normal.   Having a sore throat.  Oxygenation and • Meloxicam 15 MG Oral Tab Take 1 tablet (15 mg total) by mouth daily. 60 tablet 0   • Montelukast Sodium 10 MG Oral Tab Take 1 tablet (10 mg total) by mouth nightly.  90 tablet 0   • Fluticasone Propionate 50 MCG/ACT Nasal Suspension 2 sprays by Each Nare Emotionally abused: Not on file        Physically abused: Not on file        Forced sexual activity: Not on file    Other Topics      Concerns:        Not on file    Social History Narrative      Work - [employer] in . [address] I have complete jia in the body's ability to heal and transform. The products and items listed below (the “Products”)  and their claims have not been evaluated by the Food and Drug Administration.  Dietary products are not intended to treat, prevent, m

## 2020-05-07 RX ORDER — MELOXICAM 15 MG/1
TABLET ORAL
Qty: 90 TABLET | Refills: 0 | Status: SHIPPED | OUTPATIENT
Start: 2020-05-07 | End: 2020-08-03

## 2020-05-07 RX ORDER — FLUTICASONE PROPIONATE 50 MCG
SPRAY, SUSPENSION (ML) NASAL
Qty: 48 G | Refills: 0 | Status: SHIPPED | OUTPATIENT
Start: 2020-05-07 | End: 2020-08-10

## 2020-08-04 RX ORDER — MONTELUKAST SODIUM 10 MG/1
10 TABLET ORAL NIGHTLY
Qty: 90 TABLET | Refills: 0 | Status: SHIPPED | OUTPATIENT
Start: 2020-08-04

## 2020-08-04 RX ORDER — MELOXICAM 15 MG/1
15 TABLET ORAL DAILY
Qty: 90 TABLET | Refills: 0 | Status: SHIPPED | OUTPATIENT
Start: 2020-08-04

## 2020-08-04 NOTE — TELEPHONE ENCOUNTER
A refill request was received for:  Requested Prescriptions     Pending Prescriptions Disp Refills   • Montelukast Sodium 10 MG Oral Tab 90 tablet 0     Sig: Take 1 tablet (10 mg total) by mouth nightly.    • Meloxicam 15 MG Oral Tab 90 tablet 0     Sig: Ta

## 2020-08-10 ENCOUNTER — OFFICE VISIT (OUTPATIENT)
Dept: INTEGRATIVE MEDICINE | Facility: CLINIC | Age: 55
End: 2020-08-10
Payer: COMMERCIAL

## 2020-08-10 VITALS
DIASTOLIC BLOOD PRESSURE: 80 MMHG | SYSTOLIC BLOOD PRESSURE: 124 MMHG | HEIGHT: 72 IN | OXYGEN SATURATION: 97 % | HEART RATE: 59 BPM | BODY MASS INDEX: 31.48 KG/M2 | WEIGHT: 232.38 LBS

## 2020-08-10 DIAGNOSIS — D17.1 LIPOMA OF TORSO: ICD-10-CM

## 2020-08-10 DIAGNOSIS — J45.909 UNCOMPLICATED ASTHMA, UNSPECIFIED ASTHMA SEVERITY, UNSPECIFIED WHETHER PERSISTENT: Primary | ICD-10-CM

## 2020-08-10 DIAGNOSIS — M51.36 DEGENERATIVE DISC DISEASE, LUMBAR: ICD-10-CM

## 2020-08-10 DIAGNOSIS — G89.29 CHRONIC BILATERAL LOW BACK PAIN WITHOUT SCIATICA: ICD-10-CM

## 2020-08-10 DIAGNOSIS — M54.50 CHRONIC BILATERAL LOW BACK PAIN WITHOUT SCIATICA: ICD-10-CM

## 2020-08-10 PROCEDURE — 99214 OFFICE O/P EST MOD 30 MIN: CPT | Performed by: PHYSICIAN ASSISTANT

## 2020-08-10 PROCEDURE — 3008F BODY MASS INDEX DOCD: CPT | Performed by: PHYSICIAN ASSISTANT

## 2020-08-10 PROCEDURE — 3074F SYST BP LT 130 MM HG: CPT | Performed by: PHYSICIAN ASSISTANT

## 2020-08-10 PROCEDURE — 3079F DIAST BP 80-89 MM HG: CPT | Performed by: PHYSICIAN ASSISTANT

## 2020-08-10 RX ORDER — FLUTICASONE PROPIONATE AND SALMETEROL 250; 50 UG/1; UG/1
1 POWDER RESPIRATORY (INHALATION) EVERY 12 HOURS SCHEDULED
Qty: 1 EACH | Refills: 2 | Status: SHIPPED | OUTPATIENT
Start: 2020-08-10

## 2020-08-10 NOTE — PROGRESS NOTES
Teodoro Mensah is a 62year old male. Patient presents with: Follow - Up: post-op bump , back pain , med refill       HPI:   Patient presents for follow up. Continue to Take NSAID for pain. Ran out and was in pain for a few days. He rates pain as 9/10.  H Inhalation Aerosol Powder, Breath Activated Inhale 1 puff into the lungs every 12 (twelve) hours. 1 each 2   • Montelukast Sodium 10 MG Oral Tab Take 1 tablet (10 mg total) by mouth nightly.  90 tablet 0   • Meloxicam 15 MG Oral Tab Take 1 tablet (15 mg tot file    Social History Narrative      Work - [employer] in [address]:     Past Surgical History:   Procedure Laterality Date   • Colonoscopy     • Hernia surgery     • Other      IR FLUOROSCOPIC GUIDED RETROPERITONEAL ABSCESS (CPT=72148); Future    3. Chronic bilateral low back pain without sciatica  - MRI SPINE LUMBAR (CPT=72148); Future    4. Lipoma of torso  - SURGERY - INTERNAL      Will try advair inhaler for better asthma control.      Referred to general surgery for lipom

## 2020-08-18 ENCOUNTER — HOSPITAL ENCOUNTER (OUTPATIENT)
Dept: MRI IMAGING | Facility: HOSPITAL | Age: 55
Discharge: HOME | End: 2020-08-18
Attending: PHYSICIAN ASSISTANT
Payer: COMMERCIAL

## 2020-08-18 DIAGNOSIS — F41.9 ANXIETY: ICD-10-CM

## 2020-08-18 DIAGNOSIS — M51.36 DEGENERATIVE DISC DISEASE, LUMBAR: ICD-10-CM

## 2020-08-18 DIAGNOSIS — M54.50 CHRONIC BILATERAL LOW BACK PAIN WITHOUT SCIATICA: ICD-10-CM

## 2020-08-18 DIAGNOSIS — G89.29 CHRONIC BILATERAL LOW BACK PAIN WITHOUT SCIATICA: ICD-10-CM

## 2020-08-18 RX ORDER — ALPRAZOLAM 0.25 MG/1
0.25 TABLET ORAL NIGHTLY PRN
Qty: 4 TABLET | Refills: 0 | Status: SHIPPED | OUTPATIENT
Start: 2020-08-18

## 2020-08-18 NOTE — TELEPHONE ENCOUNTER
Patient would like sedatives for scheduled MRI. # [phone]    Pharrnacy:  Rito in [facility] that is on file.

## 2020-08-18 NOTE — TELEPHONE ENCOUNTER
Patient cannot receive Ativan from [facility] because it is a special order at any [facility]. Pt is having MRI tommorow at 9 am and would like an alternative called into the [facility] in [place], Valium?    [phone]    Please call in an alternative to

## 2020-08-19 ENCOUNTER — TELEPHONE (OUTPATIENT)
Dept: INTEGRATIVE MEDICINE | Facility: CLINIC | Age: 55
End: 2020-08-19

## 2020-08-19 NOTE — TELEPHONE ENCOUNTER
Patient left message with answering service re: updates on medication, patient declared he will no longer be taking his prescribed medication.  Please advise, Thank you

## 2021-01-19 NOTE — ED NOTES
Orders for admission, patient is aware of plan and ready to go upstairs. Any questions, please call ED RN Max at extension [phone]. Blood cultures, abx completed in ED.   PT currently receiving first 1L of 2694 ML fluid bolus, potentially receiving 2nd liter Additional Progress Note...

## 2021-03-09 ENCOUNTER — TELEPHONE (OUTPATIENT)
Dept: INTEGRATIVE MEDICINE | Facility: CLINIC | Age: 56
End: 2021-03-09

## 2021-03-09 NOTE — TELEPHONE ENCOUNTER
Received voicemail from pt, COVID vaccine questions. I spoke with pt, said he works in [email or web address]. Advised to log onto his [employer] account, fill out occupation questionnaire under main menu - questionnaire.  If he does qualify, this would \"bump\" him

## 2021-03-29 ENCOUNTER — IMMUNIZATION (OUTPATIENT)
Dept: LAB | Facility: HOSPITAL | Age: 56
End: 2021-03-29
Attending: HOSPITALIST
Payer: COMMERCIAL

## 2021-03-29 DIAGNOSIS — Z23 NEED FOR VACCINATION: Primary | ICD-10-CM

## 2021-03-29 PROCEDURE — 0011A SARSCOV2 VAC 100MCG/0.5ML IM: CPT

## 2021-04-26 ENCOUNTER — IMMUNIZATION (OUTPATIENT)
Dept: LAB | Facility: HOSPITAL | Age: 56
End: 2021-04-26
Attending: EMERGENCY MEDICINE
Payer: COMMERCIAL

## 2021-04-26 DIAGNOSIS — Z23 NEED FOR VACCINATION: Primary | ICD-10-CM

## 2021-04-26 PROCEDURE — 0012A SARSCOV2 VAC 100MCG/0.5ML IM: CPT

## 2022-11-01 NOTE — TELEPHONE ENCOUNTER
I left message on patient's voicemail on 11/18/19 advising patient to call with an update. [Negative] : Gastrointestinal

## 2023-05-31 NOTE — PLAN OF CARE
[address]    05/31/23        Fany Loredo  1997  [address] [place] [id number]       Rose is a patient and under our care in our office  Felice Loredo's Estimated Date of Delivery: 12/27/23  Any questions or concerns feel free to contact our office           Respectfully,    [facility]  [id number] [facility]/Mavis Iverson [address]  [address]/[place]  [phone]   [facility]   [address] [facility]  [phone] Chest tube and dean drain remain. Decreased output from chest tube, but chest x-ray showing improvement. Plan discharge by Tuesday.

## (undated) DEVICE — VAGINAL PACKING: Brand: DEROYAL

## (undated) DEVICE — DISPOSABLE SUCTION/IRRIGATOR TUBE SET: Brand: AHTO

## (undated) DEVICE — ABDOMINAL BINDER: Brand: DEROYAL

## (undated) DEVICE — INSULATED BLADE ELECTRODE 6.5

## (undated) DEVICE — DRAIN RESERVOIR RELIAVAC 100CC

## (undated) DEVICE — MEDI-VAC NON-CONDUCTIVE SUCTION TUBING: Brand: CARDINAL HEALTH

## (undated) DEVICE — SOL  .9 3000ML

## (undated) DEVICE — TROCAR: Brand: KII® SLEEVE

## (undated) DEVICE — FLEXIBLE YANKAUER,MEDIUM TIP, NO VACUUM CONTROL: Brand: ARGYLE

## (undated) DEVICE — SUTURE PDS II 0 CTX

## (undated) DEVICE — LAP CHOLE: Brand: MEDLINE INDUSTRIES, INC.

## (undated) DEVICE — NON-ADHERENT PAD PREPACK: Brand: TELFA

## (undated) DEVICE — POOLE SUCTION INSTRUMENT WITH REMOVABLE SHEATH: Brand: POOLE

## (undated) DEVICE — GAUZE SPONGES,12 PLY: Brand: CURITY

## (undated) DEVICE — [HIGH FLOW INSUFFLATOR,  DO NOT USE IF PACKAGE IS DAMAGED,  KEEP DRY,  KEEP AWAY FROM SUNLIGHT,  PROTECT FROM HEAT AND RADIOACTIVE SOURCES.]: Brand: PNEUMOSURE

## (undated) DEVICE — DRAIN BLAKE 24FR O.H.

## (undated) DEVICE — GAMMEX® PI HYBRID SIZE 7.5, STERILE POWDER-FREE SURGICAL GLOVE, POLYISOPRENE AND NEOPRENE BLEND: Brand: GAMMEX

## (undated) DEVICE — TROCAR: Brand: KII FIOS FIRST ENTRY

## (undated) DEVICE — PROXIMATE SKIN STAPLERS (35 WIDE) CONTAINS 35 STAINLESS STEEL STAPLES (FIXED HEAD): Brand: PROXIMATE

## (undated) DEVICE — SOL  .9 1000ML BTL

## (undated) DEVICE — SUTURE ETHILON 2-0 FS

## (undated) NOTE — LETTER
[address] [place] [address]  Authorization for Invasive Procedures  1.  I hereby authorize Dr. Kendrick Her , my physician and whomever may be designated as the doctor's assistant, to perform the following operation and/or procedure:  Computer 5. I consent to the photographing of the operations or procedures to be performed for the purposes of advancing medicine, science, and/or education, provided my identity is not revealed.  If the procedure has been videotaped, the physician/surgeon will obta __________ Time: ___________    Statement of Physician  My signature below affirms that prior to the time of the procedure, I have explained to the patient and/or his legal representative, the risks and benefits involved in the proposed treatment and any r

## (undated) NOTE — LETTER
[address], [place]  Authorization for Invasive Procedures  1.  I hereby authorize                     , my physician and whomever may be designated as the doctor's assistant, to perform the following operation and/or procedure 5. I consent to the photographing of the operations or procedures to be performed for the purposes of advancing medicine, science, and/or education, provided my identity is not revealed.  If the procedure has been videotaped, the physician/surgeon will obta __________ Time: ___________    Statement of Physician  My signature below affirms that prior to the time of the procedure, I have explained to the patient and/or his legal representative, the risks and benefits involved in the proposed treatment and any r

## (undated) NOTE — LETTER
[address], [place]  Authorization for Invasive Procedures  1.  I hereby authorize Dr. Vargas Heart , my physician and whomever may be designated as the doctor's assistant, to perform the following operation and/or proce performed for the purposes of advancing medicine, science, and/or education, provided my identity is not revealed. If the procedure has been videotaped, the physician/surgeon will obtain the original videotape.  The hospital will not be responsible for stor My signature below affirms that prior to the time of the procedure, I have explained to the patient and/or his legal representative, the risks and benefits involved in the proposed treatment and any reasonable alternative to the proposed treatment.  I have

## (undated) NOTE — LETTER
[facility], [place]  Authorization for Invasive Procedures  1.  I hereby authorize Dr. Cece Rdz , my physician and whomever may be designated as the doctor's assistant, to perform the following operation and/or procedure:  Drain performed for the purposes of advancing medicine, science, and/or education, provided my identity is not revealed. If the procedure has been videotaped, the physician/surgeon will obtain the original videotape.  The hospital will not be responsible for stor My signature below affirms that prior to the time of the procedure, I have explained to the patient and/or his legal representative, the risks and benefits involved in the proposed treatment and any reasonable alternative to the proposed treatment.  I have